# Patient Record
Sex: FEMALE | Race: WHITE | NOT HISPANIC OR LATINO | Employment: OTHER | URBAN - METROPOLITAN AREA
[De-identification: names, ages, dates, MRNs, and addresses within clinical notes are randomized per-mention and may not be internally consistent; named-entity substitution may affect disease eponyms.]

---

## 2017-01-22 DIAGNOSIS — F41.9 ANXIETY: ICD-10-CM

## 2017-01-23 RX ORDER — BUPROPION HYDROCHLORIDE 150 MG/1
TABLET, EXTENDED RELEASE ORAL
Qty: 30 TABLET | Refills: 11 | Status: SHIPPED | OUTPATIENT
Start: 2017-01-23 | End: 2019-11-22 | Stop reason: SDUPTHER

## 2017-01-23 NOTE — TELEPHONE ENCOUNTER
Carly desire refill of Wellbutrin. Last visit post op visit 7/16  Patient Active Problem List   Diagnosis    History of stillbirth in currently pregnant patient    Supervision of normal pregnancy    Pregnancy    Back pain affecting pregnancy in third trimester    Normal labor     Prior to Admission medications    Medication Sig Start Date End Date Taking? Authorizing Provider   buPROPion (WELLBUTRIN SR) 150 MG TBSR 12 hr tablet Take 1 tablet (150 mg total) by mouth once daily. 1/4/16   Joel Schaefer MD   CITRANATAL 90 DHA, ALGAL OIL, 90 mg iron-1 mg -50 mg-300 mg Cmpk 1 tablet once daily. 6/27/16   Historical Provider, MD   oxycodone-acetaminophen (PERCOCET)  mg per tablet Take 1 tablet by mouth every 6 (six) hours as needed. 6/26/16   Alia Rodriguez MD   CMS54-mane [id number]&gluc-[id number]-DSS-dha (CITRANATAL 90 DHA, NEW FORMULA) 90 mg iron-1 mg -50 mg-300 mg Cmpk Take 1 tablet by mouth once daily. 10/19/15   Joel Schaefer MD

## 2017-11-13 PROBLEM — M48.062 LUMBAR STENOSIS WITH NEUROGENIC CLAUDICATION: Status: ACTIVE | Noted: 2017-11-13

## 2018-08-17 ENCOUNTER — OFFICE VISIT (OUTPATIENT)
Dept: OBSTETRICS AND GYNECOLOGY | Facility: CLINIC | Age: 33
End: 2018-08-17
Payer: COMMERCIAL

## 2018-08-17 VITALS
HEART RATE: 78 BPM | DIASTOLIC BLOOD PRESSURE: 80 MMHG | RESPIRATION RATE: 10 BRPM | HEIGHT: 67 IN | BODY MASS INDEX: 29.51 KG/M2 | WEIGHT: 188 LBS | SYSTOLIC BLOOD PRESSURE: 120 MMHG

## 2018-08-17 DIAGNOSIS — N63.10 BREAST MASS, RIGHT: Primary | ICD-10-CM

## 2018-08-17 PROCEDURE — 99213 OFFICE O/P EST LOW 20 MIN: CPT | Mod: S$GLB,,, | Performed by: OBSTETRICS & GYNECOLOGY

## 2018-08-17 PROCEDURE — 99999 PR PBB SHADOW E&M-EST. PATIENT-LVL III: CPT | Mod: PBBFAC,,, | Performed by: OBSTETRICS & GYNECOLOGY

## 2018-08-17 PROCEDURE — 3008F BODY MASS INDEX DOCD: CPT | Mod: CPTII,S$GLB,, | Performed by: OBSTETRICS & GYNECOLOGY

## 2018-08-17 NOTE — PROGRESS NOTES
Subjective:    Patient ID: Carly Go is a 33 y.o. y.o. female.     Chief Complaint:   Chief Complaint   Patient presents with    Breast Mass       History of Present Illness:   Carly presents for evaluation of a right breast mass palpated for ~ 1 week. She reports she noticed her breast was tender and that is when she found the mass. She denies any trauma. No skin changes. No nipple discharge. Does feel as if the mass has increased in size the last few days.     ROS:   CONSTITUTIONAL: Negative for fever, chills, diaphoresis, weakness, fatigue, weight loss, weight gain  GASTROINTESTINAL: negative for abdominal pain, flank pain, nausea, vomiting, diarrhea, constipation, black stool, blood in stool  BREAST: As above  GENITOURINARY: negative for dysuria, frequency/urgency, hematuria, genital discharge, vaginal bleeding, irregular menses, heavy menses, pelvic pain  HEMATOLOGIC/LYMPHATIC: negative for swollen lymph nodes, bleeding, bruising  MUSCULOSKELETAL: negative for back pain, joint pain, joint stiffness, joint swelling, muscle pain, muscle weakness  ENDOCRINE: negative for polydipsia/polyuria, palpitations, skin changes, temperature intolerance, unexpected weight changes      Physical Exam:   Vital Signs:  Vitals:    08/17/18 1423   BP: 120/80   Pulse: 78   Resp: 10       General:  alert,normal appearing gravid female   Breasts:  left breast with no masses, tenderness, or nipple discharge. Right breast with ~ 3 cm mobile, smooth, round mass palpated at the 12 oclock position ~ 2-3 cm superior to nipple       Assessment:      1. Breast mass, right          Plan:      Breast mass, right  -     US Breast Right Complete; Future; Expected date: 08/17/2018  -     Mammo Digital Diagnostic Right with Marlo; Future; Expected date: 08/17/2018

## 2018-08-21 ENCOUNTER — HOSPITAL ENCOUNTER (OUTPATIENT)
Dept: RADIOLOGY | Facility: HOSPITAL | Age: 33
Discharge: HOME | End: 2018-08-21
Attending: OBSTETRICS & GYNECOLOGY
Payer: COMMERCIAL

## 2018-08-21 VITALS — HEIGHT: 67 IN | BODY MASS INDEX: 29.51 KG/M2 | WEIGHT: 188 LBS

## 2018-08-21 DIAGNOSIS — N63.10 BREAST MASS, RIGHT: ICD-10-CM

## 2018-08-21 PROCEDURE — 77066 DX MAMMO INCL CAD BI: CPT | Mod: 26,,, | Performed by: RADIOLOGY

## 2018-08-21 PROCEDURE — 77062 BREAST TOMOSYNTHESIS BI: CPT | Mod: TC

## 2018-08-21 PROCEDURE — 76642 ULTRASOUND BREAST LIMITED: CPT | Mod: 26,RT,, | Performed by: RADIOLOGY

## 2018-08-21 PROCEDURE — 76642 ULTRASOUND BREAST LIMITED: CPT | Mod: TC,RT

## 2018-08-21 PROCEDURE — 77062 BREAST TOMOSYNTHESIS BI: CPT | Mod: 26,,, | Performed by: RADIOLOGY

## 2019-10-07 ENCOUNTER — TELEPHONE (OUTPATIENT)
Dept: OBSTETRICS AND GYNECOLOGY | Facility: CLINIC | Age: 34
End: 2019-10-07

## 2019-10-07 NOTE — TELEPHONE ENCOUNTER
----- Message from Suly Hennessy MA sent at 10/7/2019  2:38 PM CDT -----  Contact: Self      ----- Message -----  From: Roxanne Michelle  Sent: 10/7/2019   2:28 PM CDT  To: Jennifer Rojo Staff    Carly Go  MRN: [id number]  Home Phone      [phone]  Work Phone      Not on file.  Mobile          [phone]    Patient Care Team:  Naya Garcia NP as PCP - General (Family Medicine)  Joel Schaefer MD as Obstetrician (Obstetrics)  OB? No  What phone number can you be reached at? [phone]  Message:   Pt states she has a lump in her breast and she would like to get seen. Please return call.

## 2019-10-07 NOTE — TELEPHONE ENCOUNTER
Pt had a mammogram and u/s 8/2018 was suppose to follow up and is now calling appointment given. Patient states that the lump is now bigger and noticeable state she does have nipple discharge occasionally it is clear with yellowish brownish color with  An occasional deep ache and sharp pain. Pt scheduled to accommodate her schedule.

## 2019-10-11 ENCOUNTER — OFFICE VISIT (OUTPATIENT)
Dept: OBSTETRICS AND GYNECOLOGY | Facility: CLINIC | Age: 34
End: 2019-10-11
Payer: COMMERCIAL

## 2019-10-11 VITALS
HEART RATE: 76 BPM | DIASTOLIC BLOOD PRESSURE: 88 MMHG | RESPIRATION RATE: 13 BRPM | HEIGHT: 67 IN | WEIGHT: 194 LBS | BODY MASS INDEX: 30.45 KG/M2 | SYSTOLIC BLOOD PRESSURE: 142 MMHG

## 2019-10-11 DIAGNOSIS — N63.10 BREAST MASS, RIGHT: Primary | ICD-10-CM

## 2019-10-11 PROCEDURE — 3008F BODY MASS INDEX DOCD: CPT | Mod: CPTII,S$GLB,, | Performed by: OBSTETRICS & GYNECOLOGY

## 2019-10-11 PROCEDURE — 99214 OFFICE O/P EST MOD 30 MIN: CPT | Mod: S$GLB,,, | Performed by: OBSTETRICS & GYNECOLOGY

## 2019-10-11 PROCEDURE — 99999 PR PBB SHADOW E&M-EST. PATIENT-LVL III: ICD-10-PCS | Mod: PBBFAC,,, | Performed by: OBSTETRICS & GYNECOLOGY

## 2019-10-11 PROCEDURE — 99999 PR PBB SHADOW E&M-EST. PATIENT-LVL III: CPT | Mod: PBBFAC,,, | Performed by: OBSTETRICS & GYNECOLOGY

## 2019-10-11 PROCEDURE — 99214 PR OFFICE/OUTPT VISIT, EST, LEVL IV, 30-39 MIN: ICD-10-PCS | Mod: S$GLB,,, | Performed by: OBSTETRICS & GYNECOLOGY

## 2019-10-11 PROCEDURE — 3008F PR BODY MASS INDEX (BMI) DOCUMENTED: ICD-10-PCS | Mod: CPTII,S$GLB,, | Performed by: OBSTETRICS & GYNECOLOGY

## 2019-10-11 RX ORDER — TIZANIDINE 4 MG/1
4 TABLET ORAL DAILY
COMMUNITY
End: 2022-05-13

## 2019-10-11 RX ORDER — IBUPROFEN 100 MG/5ML
1000 SUSPENSION, ORAL (FINAL DOSE FORM) ORAL DAILY
COMMUNITY

## 2019-10-11 NOTE — PROGRESS NOTES
Subjective:       Patient ID: Carly Go is a 34 y.o. female.    Chief Complaint:  Breast Mass (right sided, seems to be bigger, has dull pain)      History of Present Illness  Patient presents complaining of a breast mass in her right breast.  Patient states approximately 1 year ago she had a mammogram done and she was post have a close follow-up.  She has not had that follow-up done.  Patient states it has grown.  Initially started off small and nontender. Patient states that it is much easier to palpate now and is occasionally tender.  Patient states it is not drained anything.  Patient has a family history of breast complaints.    Menstrual History:  OB History        3    Para   2    Term   2            AB   1    Living   2       SAB   0    TAB   1    Ectopic        Multiple   0    Live Births   2                Menarche age:  Patient's last menstrual period was 09/15/2019 (approximate).         Review of Systems  Review of Systems   Constitutional: Positive for fatigue. Negative for activity change, appetite change, chills, diaphoresis, fever and unexpected weight change.   HENT: Negative for congestion, dental problem, drooling, ear discharge, ear pain, facial swelling, hearing loss, mouth sores, nosebleeds, postnasal drip, rhinorrhea, sinus pressure, sneezing, sore throat, tinnitus, trouble swallowing and voice change.    Eyes: Negative for photophobia, pain, discharge, redness, itching and visual disturbance.   Respiratory: Negative for apnea, cough, choking, chest tightness, shortness of breath, wheezing and stridor.    Cardiovascular: Negative for chest pain, palpitations and leg swelling.   Gastrointestinal: Negative for abdominal distention, abdominal pain, anal bleeding, blood in stool, constipation, diarrhea, nausea, rectal pain and vomiting.   Endocrine: Negative for cold intolerance, heat intolerance, polydipsia, polyphagia and polyuria.   Genitourinary: Positive for  vaginal bleeding. Negative for decreased urine volume, difficulty urinating, dyspareunia, dysuria, enuresis, flank pain, frequency, genital sores, hematuria, menstrual problem, pelvic pain, urgency, vaginal discharge and vaginal pain.   Musculoskeletal: Negative for arthralgias, back pain, gait problem, joint swelling, myalgias, neck pain and neck stiffness.   Skin: Negative for color change, pallor, rash and wound.   Allergic/Immunologic: Positive for environmental allergies. Negative for food allergies and immunocompromised state.   Neurological: Positive for headaches. Negative for dizziness, tremors, seizures, syncope, facial asymmetry, speech difficulty, weakness, light-headedness and numbness.   Hematological: Negative for adenopathy. Does not bruise/bleed easily.   Psychiatric/Behavioral: Negative for agitation, behavioral problems, confusion, decreased concentration, dysphoric mood, hallucinations, self-injury, sleep disturbance and suicidal ideas. The patient is not nervous/anxious and is not hyperactive.            Objective:      Physical Exam   Pulmonary/Chest: Right breast exhibits mass. Right breast exhibits no inverted nipple, no nipple discharge, no skin change and no tenderness. Left breast exhibits no inverted nipple, no mass, no nipple discharge, no skin change and no tenderness.       Nursing note and vitals reviewed.          Assessment:        1. Breast mass, right                Plan:         Carly was seen today for breast mass.    Diagnoses and all orders for this visit:    Breast mass, right  -     Mammo Digital Diagnostic Right With CAD; Future  -     US Breast Right Complete; Future

## 2019-10-17 ENCOUNTER — TELEPHONE (OUTPATIENT)
Dept: RADIOLOGY | Facility: HOSPITAL | Age: 34
End: 2019-10-17

## 2019-10-17 ENCOUNTER — HOSPITAL ENCOUNTER (OUTPATIENT)
Dept: RADIOLOGY | Facility: HOSPITAL | Age: 34
Discharge: HOME | End: 2019-10-17
Attending: OBSTETRICS & GYNECOLOGY
Payer: COMMERCIAL

## 2019-10-17 ENCOUNTER — TELEPHONE (OUTPATIENT)
Dept: OBSTETRICS AND GYNECOLOGY | Facility: CLINIC | Age: 34
End: 2019-10-17

## 2019-10-17 ENCOUNTER — PATIENT MESSAGE (OUTPATIENT)
Dept: RADIOLOGY | Facility: HOSPITAL | Age: 34
End: 2019-10-17

## 2019-10-17 VITALS — WEIGHT: 194 LBS | HEIGHT: 67 IN | BODY MASS INDEX: 30.45 KG/M2

## 2019-10-17 DIAGNOSIS — R92.8 ABNORMAL MAMMOGRAM: Primary | ICD-10-CM

## 2019-10-17 DIAGNOSIS — N63.10 BREAST MASS, RIGHT: ICD-10-CM

## 2019-10-17 DIAGNOSIS — N63.10 BREAST MASS, RIGHT: Primary | ICD-10-CM

## 2019-10-17 PROCEDURE — 76642 US BREAST RIGHT LIMITED: ICD-10-PCS | Mod: 26,RT,, | Performed by: RADIOLOGY

## 2019-10-17 PROCEDURE — 77061 MAMMO DIGITAL DIAGNOSTIC RIGHT WITH TOMOSYNTHESIS_CAD: ICD-10-PCS | Mod: 26,,, | Performed by: RADIOLOGY

## 2019-10-17 PROCEDURE — 76642 ULTRASOUND BREAST LIMITED: CPT | Mod: TC,RT

## 2019-10-17 PROCEDURE — 76642 ULTRASOUND BREAST LIMITED: CPT | Mod: 26,RT,, | Performed by: RADIOLOGY

## 2019-10-17 PROCEDURE — 77061 BREAST TOMOSYNTHESIS UNI: CPT | Mod: 26,,, | Performed by: RADIOLOGY

## 2019-10-17 PROCEDURE — 77065 DX MAMMO INCL CAD UNI: CPT | Mod: 26,,, | Performed by: RADIOLOGY

## 2019-10-17 PROCEDURE — 77065 MAMMO DIGITAL DIAGNOSTIC RIGHT WITH TOMOSYNTHESIS_CAD: ICD-10-PCS | Mod: 26,,, | Performed by: RADIOLOGY

## 2019-10-17 PROCEDURE — 77065 DX MAMMO INCL CAD UNI: CPT | Mod: TC

## 2019-10-17 NOTE — TELEPHONE ENCOUNTER
----- Message from Lindsey Douglas LPN sent at 10/17/2019 10:41 AM CDT -----  Pt states she would like to move forward with a breast bx in [place], orders placed. Please schedule.

## 2019-10-17 NOTE — TELEPHONE ENCOUNTER
Spoke with patient. Reviewed breast biopsy procedure and reviewed instructions for breast biopsy. Patient expressed understanding and all questions were answered. Provided patient with my phone number to call for any further concerns or questions.   Patient scheduled breast biopsy at the [facility] on 10/23/2019.

## 2019-10-23 ENCOUNTER — HOSPITAL ENCOUNTER (OUTPATIENT)
Dept: RADIOLOGY | Facility: HOSPITAL | Age: 34
Discharge: HOME | End: 2019-10-23
Attending: OBSTETRICS & GYNECOLOGY
Payer: COMMERCIAL

## 2019-10-23 DIAGNOSIS — N63.10 BREAST MASS, RIGHT: ICD-10-CM

## 2019-10-23 PROCEDURE — 19083 US BREAST BIOPSY WITH IMAGING 1ST SITE RIGHT: ICD-10-PCS | Mod: RT,,, | Performed by: RADIOLOGY

## 2019-10-23 PROCEDURE — 88305 TISSUE EXAM BY PATHOLOGIST: CPT | Mod: 26,,, | Performed by: PATHOLOGY

## 2019-10-23 PROCEDURE — 77065 DX MAMMO INCL CAD UNI: CPT | Mod: TC,PO,RT

## 2019-10-23 PROCEDURE — 88305 TISSUE SPECIMEN TO PATHOLOGY, RADIOLOGY: ICD-10-PCS | Mod: 26,,, | Performed by: PATHOLOGY

## 2019-10-23 PROCEDURE — 77065 DX MAMMO INCL CAD UNI: CPT | Mod: 26,RT,, | Performed by: RADIOLOGY

## 2019-10-23 PROCEDURE — 25000003 PHARM REV CODE 250: Mod: PO | Performed by: OBSTETRICS & GYNECOLOGY

## 2019-10-23 PROCEDURE — 19083 BX BREAST 1ST LESION US IMAG: CPT | Mod: RT,,, | Performed by: RADIOLOGY

## 2019-10-23 PROCEDURE — 77065 MAMMO DIGITAL DIAGNOSTIC RIGHT WITH CAD: ICD-10-PCS | Mod: 26,RT,, | Performed by: RADIOLOGY

## 2019-10-23 PROCEDURE — 88305 TISSUE EXAM BY PATHOLOGIST: CPT | Performed by: PATHOLOGY

## 2019-10-23 PROCEDURE — 27201068 US BREAST BIOPSY WITH IMAGING 1ST SITE RIGHT: Mod: PO

## 2019-10-23 RX ORDER — LIDOCAINE HYDROCHLORIDE AND EPINEPHRINE 20; 10 MG/ML; UG/ML
9 INJECTION, SOLUTION INFILTRATION; PERINEURAL ONCE
Status: COMPLETED | OUTPATIENT
Start: 2019-10-23 | End: 2019-10-23

## 2019-10-23 RX ORDER — LIDOCAINE HYDROCHLORIDE 10 MG/ML
2 INJECTION, SOLUTION EPIDURAL; INFILTRATION; INTRACAUDAL; PERINEURAL ONCE
Status: COMPLETED | OUTPATIENT
Start: 2019-10-23 | End: 2019-10-23

## 2019-10-23 RX ADMIN — LIDOCAINE HYDROCHLORIDE 2 ML: 10 INJECTION, SOLUTION EPIDURAL; INFILTRATION; INTRACAUDAL; PERINEURAL at 01:10

## 2019-10-23 RX ADMIN — LIDOCAINE HYDROCHLORIDE AND EPINEPHRINE 9 ML: 20; 10 INJECTION, SOLUTION INFILTRATION; PERINEURAL at 01:10

## 2019-10-25 ENCOUNTER — TELEPHONE (OUTPATIENT)
Dept: SURGERY | Facility: CLINIC | Age: 34
End: 2019-10-25

## 2019-10-25 NOTE — TELEPHONE ENCOUNTER
Pt received biopsy results from Dr. Landis. Spoke to pt to schedule appt with breast surgeon. Pt was scheduled to see Dr. Blanchard on 10/28/2019. Reviewed location with pt. Pt verbalized understanding

## 2019-10-28 ENCOUNTER — OFFICE VISIT (OUTPATIENT)
Dept: SURGERY | Facility: CLINIC | Age: 34
End: 2019-10-28
Payer: COMMERCIAL

## 2019-10-28 VITALS
HEIGHT: 67 IN | TEMPERATURE: 99 F | DIASTOLIC BLOOD PRESSURE: 101 MMHG | BODY MASS INDEX: 30.5 KG/M2 | WEIGHT: 194.31 LBS | HEART RATE: 86 BPM | SYSTOLIC BLOOD PRESSURE: 163 MMHG

## 2019-10-28 DIAGNOSIS — Z91.89 AT HIGH RISK FOR BREAST CANCER: ICD-10-CM

## 2019-10-28 DIAGNOSIS — N63.10 BREAST MASS, RIGHT: Primary | ICD-10-CM

## 2019-10-28 PROCEDURE — 99204 PR OFFICE/OUTPT VISIT, NEW, LEVL IV, 45-59 MIN: ICD-10-PCS | Mod: S$GLB,,, | Performed by: SURGERY

## 2019-10-28 PROCEDURE — 3008F PR BODY MASS INDEX (BMI) DOCUMENTED: ICD-10-PCS | Mod: CPTII,S$GLB,, | Performed by: SURGERY

## 2019-10-28 PROCEDURE — 3008F BODY MASS INDEX DOCD: CPT | Mod: CPTII,S$GLB,, | Performed by: SURGERY

## 2019-10-28 PROCEDURE — 99999 PR PBB SHADOW E&M-EST. PATIENT-LVL III: ICD-10-PCS | Mod: PBBFAC,,, | Performed by: SURGERY

## 2019-10-28 PROCEDURE — 99999 PR PBB SHADOW E&M-EST. PATIENT-LVL III: CPT | Mod: PBBFAC,,, | Performed by: SURGERY

## 2019-10-28 PROCEDURE — 99204 OFFICE O/P NEW MOD 45 MIN: CPT | Mod: S$GLB,,, | Performed by: SURGERY

## 2019-10-28 NOTE — PROGRESS NOTES
Breast Surgery  [facility]  Department of Surgery      REFERRING PROVIDER: SAADIA Fernandez MD--OB/GYN    Chief Complaint: right Fibroepithelial Lesion    Subjective:      Patient ID: Carly Go is a 34 y.o. female who presents with diagnostic mammogram which showed an oval mass in the right breast at 12oc in the middle depth.  Follow up US showed a 2.6cm heterogeneous mass at 12oc in the middle depth corresponding with the MMG on 10/17/2019.    A ultrasound guided biopsy was performed on 10/23/2019 with pathology revealing fibroepithelial growth of the right breast.     Patient does routinely do self breast exams.  Patient has noted a change on breast exam.  First noticed the mass 2018, thinks it got a little larger over the last year. Patient rerports bloody nipple discharge starting after the biopsy, with expression only in the shower. Patient denies to previous breast biopsy. Patient denies a personal history of breast cancer.    Patients tyer cuzick score is 42.59%.    Mother breast cancer 35, maternal GM breast and ovarian cancer.  PGM breast cancer  Father lung and esophageal cancer    Patient has had a left oophorectomy performed in .        GYN History:  Patient is . Age of first live birth was 20. Patient did breast feed.    Family History:  Maternal Grandmother Breast cancer at 53  Maternal Grandmother Ovarian cancer  Mother breast cancer at 35    Past Medical History:   Diagnosis Date    Abnormal Pap smear     Asthma     Encounter for blood transfusion     Lumbar stenosis 2017    Mental disorder     anxiety, depression    Thyroid disease 2017    thyroid mass, recent biopsy     Past Surgical History:   Procedure Laterality Date    BACK SURGERY      lumbar laminectomy    DILATION AND CURETTAGE OF UTERUS  2004    OOPHORECTOMY  2009    Left    TUBAL LIGATION  2016     Current Outpatient Medications on File Prior to Visit   Medication  Sig Dispense Refill    ACETAMINOPHEN (TYLENOL ARTHRITIS PAIN ORAL) Take by mouth daily as needed.      ascorbic acid, vitamin C, (VITAMIN C) 1000 MG tablet Take 1,000 mg by mouth once daily.      buPROPion (WELLBUTRIN SR) 150 MG TBSR 12 hr tablet TAKE 1 TABLET (150 MG TOTAL) BY MOUTH ONCE DAILY. (Patient taking differently: 300mg daily) 30 tablet 11    gabapentin (NEURONTIN) 300 MG capsule Take 300 mg by mouth 2 (two) times daily.      loratadine (CLARITIN) 10 mg tablet Take 10 mg by mouth once daily.      tiZANidine (ZANAFLEX) 4 MG tablet Take 4 mg by mouth once daily.       No current facility-administered medications on file prior to visit.      Social History     Socioeconomic History    Marital status:      Spouse name: Not on file    Number of children: Not on file    Years of education: Not on file    Highest education level: Not on file   Occupational History    Not on file   Social Needs    Financial resource strain: Not on file    Food insecurity:     Worry: Not on file     Inability: Not on file    Transportation needs:     Medical: Not on file     Non-medical: Not on file   Tobacco Use    Smoking status: Never Smoker    Smokeless tobacco: Never Used   Substance and Sexual Activity    Alcohol use: No    Drug use: No    Sexual activity: Yes     Partners: Male     Birth control/protection: None     Comment:    Lifestyle    Physical activity:     Days per week: Not on file     Minutes per session: Not on file    Stress: Not on file   Relationships    Social connections:     Talks on phone: Not on file     Gets together: Not on file     Attends Confucianism service: Not on file     Active member of club or organization: Not on file     Attends meetings of clubs or organizations: Not on file     Relationship status: Not on file   Other Topics Concern    Not on file   Social History Narrative    Not on file     Family History   Problem Relation Age of Onset    Breast cancer  "Maternal Grandmother 53    Colon cancer Maternal Grandmother     Ovarian cancer Maternal Grandmother     Colon cancer Father 56    Cancer Father     Breast cancer Mother 35        Review of Systems   Pertinent items noted above.    Objective:   BP (!) 163/101 (BP Location: Left arm, Patient Position: Sitting, BP Method: Medium (Automatic))   Pulse 86   Temp 98.8 °F (37.1 °C) (Oral)   Ht 5' 7" (1.702 m)   Wt 88.2 kg (194 lb 5.4 oz)   LMP 10/19/2019 (Approximate)   BMI 30.44 kg/m²     Physical Exam   Pulmonary/Chest: She exhibits no tenderness and no deformity. Right breast exhibits mass. Right breast exhibits no inverted nipple, no nipple discharge (none on exam today.), no skin change and no tenderness. Left breast exhibits no inverted nipple, no mass, no nipple discharge, no skin change and no tenderness.       Lymphadenopathy:     She has no cervical adenopathy.     She has no axillary adenopathy.        Right: No supraclavicular adenopathy present.        Left: No supraclavicular adenopathy present.       Radiology review: Images personally reviewed by me in the clinic.     Assessment:       33 yo high risk with TC 42% and right breast mass  Plan:   Discussed high risk options.  Discussed diet, exercise, use of tamoxifen.  Also discussed genetics and MRI.  She would like to proceed with both.    For the RIGHT breast samuel with biopsy showing fibroepithelial lesion.  Discussed I suspect this is fibroadenoma given stabliity for a year, however recommend excision given the pathology findings.  Will do excisional biopsy with ultrasound.    45 minutes spent face to face with patient.    "

## 2019-10-28 NOTE — H&P (VIEW-ONLY)
Breast Surgery  [facility]  Department of Surgery      REFERRING PROVIDER: SAADIA Fernandez MD--OB/GYN    Chief Complaint: right Fibroepithelial Lesion    Subjective:      Patient ID: Carly Go is a 34 y.o. female who presents with diagnostic mammogram which showed an oval mass in the right breast at 12oc in the middle depth.  Follow up US showed a 2.6cm heterogeneous mass at 12oc in the middle depth corresponding with the MMG on 10/17/2019.    A ultrasound guided biopsy was performed on 10/23/2019 with pathology revealing fibroepithelial growth of the right breast.     Patient does routinely do self breast exams.  Patient has noted a change on breast exam.  First noticed the mass 2018, thinks it got a little larger over the last year. Patient rerports bloody nipple discharge starting after the biopsy, with expression only in the shower. Patient denies to previous breast biopsy. Patient denies a personal history of breast cancer.    Patients tyer cuzick score is 42.59%.    Mother breast cancer 35, maternal GM breast and ovarian cancer.  PGM breast cancer  Father lung and esophageal cancer    Patient has had a left oophorectomy performed in .        GYN History:  Patient is . Age of first live birth was 20. Patient did breast feed.    Family History:  Maternal Grandmother Breast cancer at 53  Maternal Grandmother Ovarian cancer  Mother breast cancer at 35    Past Medical History:   Diagnosis Date    Abnormal Pap smear     Asthma     Encounter for blood transfusion     Lumbar stenosis 2017    Mental disorder     anxiety, depression    Thyroid disease 2017    thyroid mass, recent biopsy     Past Surgical History:   Procedure Laterality Date    BACK SURGERY      lumbar laminectomy    DILATION AND CURETTAGE OF UTERUS  2004    OOPHORECTOMY  2009    Left    TUBAL LIGATION  2016     Current Outpatient Medications on File Prior to Visit   Medication  Sig Dispense Refill    ACETAMINOPHEN (TYLENOL ARTHRITIS PAIN ORAL) Take by mouth daily as needed.      ascorbic acid, vitamin C, (VITAMIN C) 1000 MG tablet Take 1,000 mg by mouth once daily.      buPROPion (WELLBUTRIN SR) 150 MG TBSR 12 hr tablet TAKE 1 TABLET (150 MG TOTAL) BY MOUTH ONCE DAILY. (Patient taking differently: 300mg daily) 30 tablet 11    gabapentin (NEURONTIN) 300 MG capsule Take 300 mg by mouth 2 (two) times daily.      loratadine (CLARITIN) 10 mg tablet Take 10 mg by mouth once daily.      tiZANidine (ZANAFLEX) 4 MG tablet Take 4 mg by mouth once daily.       No current facility-administered medications on file prior to visit.      Social History     Socioeconomic History    Marital status:      Spouse name: Not on file    Number of children: Not on file    Years of education: Not on file    Highest education level: Not on file   Occupational History    Not on file   Social Needs    Financial resource strain: Not on file    Food insecurity:     Worry: Not on file     Inability: Not on file    Transportation needs:     Medical: Not on file     Non-medical: Not on file   Tobacco Use    Smoking status: Never Smoker    Smokeless tobacco: Never Used   Substance and Sexual Activity    Alcohol use: No    Drug use: No    Sexual activity: Yes     Partners: Male     Birth control/protection: None     Comment:    Lifestyle    Physical activity:     Days per week: Not on file     Minutes per session: Not on file    Stress: Not on file   Relationships    Social connections:     Talks on phone: Not on file     Gets together: Not on file     Attends Taoism service: Not on file     Active member of club or organization: Not on file     Attends meetings of clubs or organizations: Not on file     Relationship status: Not on file   Other Topics Concern    Not on file   Social History Narrative    Not on file     Family History   Problem Relation Age of Onset    Breast cancer  "Maternal Grandmother 53    Colon cancer Maternal Grandmother     Ovarian cancer Maternal Grandmother     Colon cancer Father 56    Cancer Father     Breast cancer Mother 35        Review of Systems   Pertinent items noted above.    Objective:   BP (!) 163/101 (BP Location: Left arm, Patient Position: Sitting, BP Method: Medium (Automatic))   Pulse 86   Temp 98.8 °F (37.1 °C) (Oral)   Ht 5' 7" (1.702 m)   Wt 88.2 kg (194 lb 5.4 oz)   LMP 10/19/2019 (Approximate)   BMI 30.44 kg/m²     Physical Exam   Pulmonary/Chest: She exhibits no tenderness and no deformity. Right breast exhibits mass. Right breast exhibits no inverted nipple, no nipple discharge (none on exam today.), no skin change and no tenderness. Left breast exhibits no inverted nipple, no mass, no nipple discharge, no skin change and no tenderness.       Lymphadenopathy:     She has no cervical adenopathy.     She has no axillary adenopathy.        Right: No supraclavicular adenopathy present.        Left: No supraclavicular adenopathy present.       Radiology review: Images personally reviewed by me in the clinic.     Assessment:       33 yo high risk with TC 42% and right breast mass  Plan:   Discussed high risk options.  Discussed diet, exercise, use of tamoxifen.  Also discussed genetics and MRI.  She would like to proceed with both.    For the RIGHT breast samuel with biopsy showing fibroepithelial lesion.  Discussed I suspect this is fibroadenoma given stabliity for a year, however recommend excision given the pathology findings.  Will do excisional biopsy with ultrasound.    45 minutes spent face to face with patient.    "

## 2019-10-28 NOTE — LETTER
October 28, 2019      Joel Schaefer DPM  [address]  [place] [address]           [facility]  [address]  [place] [address]  Phone: [phone]  Fax: [phone]          Patient: Carly Go   MR Number: [id number]   YOB: 1985   Date of Visit: 10/28/2019       Dear Joel Schaefer:    Thank you for referring Carly Go to me for evaluation. Attached you will find relevant portions of my assessment and plan of care.    If you have questions, please do not hesitate to call me. I look forward to following Carly Go along with you.    Sincerely,    Joi Blanchard MD    Enclosure  CC:  No Recipients    If you would like to receive this communication electronically, please contact [email or web address] or [phone] to request more information on [employer] access.    For providers and/or their staff who would like to refer a patient to [facility], please contact us through our one-stop-shop provider referral line, [facility], at [phone].    If you feel you have received this communication in error or would no longer like to receive these types of communications, please e-mail [email or web address]

## 2019-10-31 ENCOUNTER — TELEPHONE (OUTPATIENT)
Dept: SURGERY | Facility: CLINIC | Age: 34
End: 2019-10-31

## 2019-10-31 DIAGNOSIS — N63.10 BREAST MASS, RIGHT: Primary | ICD-10-CM

## 2019-10-31 NOTE — PRE-PROCEDURE INSTRUCTIONS
PREOP INSTRUCTIONS:No solid food ,milk or milk products for 8 hours prior to procedure.Clear liquids are allowed up to 2 hours before arrival time.Clear liquids are:water,apple juice,pedialyte,gatorade,& jello.Shower instructions as well as directions to the 2nd floor Surgery Center were given.Patient encouraged to wear loose fitting,comfortable clothing that preferably buttons up the front.Medication instructions for pm prior to and am of procedure reviewed.Instructed patient to avoid taking vitamins,supplements,aspirin and ibuprofen the morning of surgery.Patient stated an understanding.    Patient denies any side effects or issues with anesthesia or sedation.

## 2019-10-31 NOTE — TELEPHONE ENCOUNTER
Spoke to Patient.  Stated that she has a 0715 MRI at the [facility].  Directions given to the [facility].  Arrival time of 1000 given to check in at the Surgery Center on the 2nd Floor of the Hospital on [address].  Stated that she will probably check in after she finishes with the MRI.   Instructed that she can have clear liquids between 9528-3205 and then to remain NPO after 0900, to wash up tonight and in the morning with an antibacterial soap, not to wear anything metal or to apply any lotions, powders, or deodorant, and to wear something easy to remove.  Patient verbalized understanding of instructions.

## 2019-11-01 ENCOUNTER — HOSPITAL ENCOUNTER (OUTPATIENT)
Dept: RADIOLOGY | Facility: HOSPITAL | Age: 34
Discharge: HOME | End: 2019-11-01
Attending: SURGERY
Payer: COMMERCIAL

## 2019-11-01 ENCOUNTER — HOSPITAL ENCOUNTER (OUTPATIENT)
Dept: RADIOLOGY | Facility: HOSPITAL | Age: 34
Discharge: HOME | End: 2019-11-01
Attending: SURGERY | Admitting: SURGERY
Payer: COMMERCIAL

## 2019-11-01 ENCOUNTER — HOSPITAL ENCOUNTER (OUTPATIENT)
Facility: HOSPITAL | Age: 34
Discharge: HOME | End: 2019-11-01
Attending: SURGERY | Admitting: SURGERY
Payer: COMMERCIAL

## 2019-11-01 ENCOUNTER — ANESTHESIA EVENT (OUTPATIENT)
Dept: SURGERY | Facility: HOSPITAL | Age: 34
End: 2019-11-01
Payer: COMMERCIAL

## 2019-11-01 ENCOUNTER — ANESTHESIA (OUTPATIENT)
Dept: SURGERY | Facility: HOSPITAL | Age: 34
End: 2019-11-01
Payer: COMMERCIAL

## 2019-11-01 VITALS
BODY MASS INDEX: 30.29 KG/M2 | SYSTOLIC BLOOD PRESSURE: 149 MMHG | WEIGHT: 193 LBS | DIASTOLIC BLOOD PRESSURE: 79 MMHG | RESPIRATION RATE: 21 BRPM | HEIGHT: 67 IN | TEMPERATURE: 99 F | HEART RATE: 83 BPM | OXYGEN SATURATION: 100 %

## 2019-11-01 DIAGNOSIS — N63.10 BREAST MASS, RIGHT: ICD-10-CM

## 2019-11-01 DIAGNOSIS — N63.10 BREAST MASS, RIGHT: Primary | ICD-10-CM

## 2019-11-01 DIAGNOSIS — Z91.89 AT HIGH RISK FOR BREAST CANCER: ICD-10-CM

## 2019-11-01 LAB
B-HCG UR QL: NEGATIVE
CTP QC/QA: YES

## 2019-11-01 PROCEDURE — 77049 MRI BREAST W/WO CONTRAST, W/CAD, BILATERAL: ICD-10-PCS | Mod: 26,,, | Performed by: RADIOLOGY

## 2019-11-01 PROCEDURE — 71000015 HC POSTOP RECOV 1ST HR: Performed by: SURGERY

## 2019-11-01 PROCEDURE — 36000706: Performed by: SURGERY

## 2019-11-01 PROCEDURE — 88305 TISSUE SPECIMEN TO PATHOLOGY - SURGERY: ICD-10-PCS | Mod: 26,,, | Performed by: PATHOLOGY

## 2019-11-01 PROCEDURE — D9220A PRA ANESTHESIA: Mod: ANES,,, | Performed by: ANESTHESIOLOGY

## 2019-11-01 PROCEDURE — 88305 TISSUE EXAM BY PATHOLOGIST: CPT | Performed by: PATHOLOGY

## 2019-11-01 PROCEDURE — 71000044 HC DOSC ROUTINE RECOVERY FIRST HOUR: Performed by: SURGERY

## 2019-11-01 PROCEDURE — 25500020 PHARM REV CODE 255: Performed by: SURGERY

## 2019-11-01 PROCEDURE — D9220A PRA ANESTHESIA: ICD-10-PCS | Mod: ANES,,, | Performed by: ANESTHESIOLOGY

## 2019-11-01 PROCEDURE — D9220A PRA ANESTHESIA: ICD-10-PCS | Mod: CRNA,,, | Performed by: REGISTERED NURSE

## 2019-11-01 PROCEDURE — A9577 INJ MULTIHANCE: HCPCS | Performed by: SURGERY

## 2019-11-01 PROCEDURE — 25000003 PHARM REV CODE 250: Performed by: SURGERY

## 2019-11-01 PROCEDURE — D9220A PRA ANESTHESIA: Mod: CRNA,,, | Performed by: REGISTERED NURSE

## 2019-11-01 PROCEDURE — 25000003 PHARM REV CODE 250: Performed by: REGISTERED NURSE

## 2019-11-01 PROCEDURE — 63600175 PHARM REV CODE 636 W HCPCS: Performed by: SURGERY

## 2019-11-01 PROCEDURE — 19120 PR EXCISE BREAST CYST: ICD-10-PCS | Mod: RT,,, | Performed by: SURGERY

## 2019-11-01 PROCEDURE — 19120 REMOVAL OF BREAST LESION: CPT | Mod: RT,,, | Performed by: SURGERY

## 2019-11-01 PROCEDURE — 88307 TISSUE SPECIMEN TO PATHOLOGY - SURGERY: ICD-10-PCS | Mod: 26,,, | Performed by: PATHOLOGY

## 2019-11-01 PROCEDURE — 63600175 PHARM REV CODE 636 W HCPCS: Performed by: REGISTERED NURSE

## 2019-11-01 PROCEDURE — 36000707: Performed by: SURGERY

## 2019-11-01 PROCEDURE — 81025 URINE PREGNANCY TEST: CPT | Performed by: SURGERY

## 2019-11-01 PROCEDURE — 63600175 PHARM REV CODE 636 W HCPCS: Performed by: ANESTHESIOLOGY

## 2019-11-01 PROCEDURE — 88307 TISSUE EXAM BY PATHOLOGIST: CPT | Mod: 26,,, | Performed by: PATHOLOGY

## 2019-11-01 PROCEDURE — 77049 MRI BREAST C-+ W/CAD BI: CPT | Mod: 26,,, | Performed by: RADIOLOGY

## 2019-11-01 PROCEDURE — 76098 X-RAY EXAM SURGICAL SPECIMEN: CPT | Mod: TC,PO

## 2019-11-01 PROCEDURE — 37000008 HC ANESTHESIA 1ST 15 MINUTES: Performed by: SURGERY

## 2019-11-01 PROCEDURE — 77049 MRI BREAST C-+ W/CAD BI: CPT | Mod: TC

## 2019-11-01 PROCEDURE — 37000009 HC ANESTHESIA EA ADD 15 MINS: Performed by: SURGERY

## 2019-11-01 RX ORDER — PROPOFOL 10 MG/ML
VIAL (ML) INTRAVENOUS
Status: DISCONTINUED | OUTPATIENT
Start: 2019-11-01 | End: 2019-11-01

## 2019-11-01 RX ORDER — SODIUM CHLORIDE 9 MG/ML
INJECTION, SOLUTION INTRAVENOUS CONTINUOUS
Status: DISCONTINUED | OUTPATIENT
Start: 2019-11-01 | End: 2019-11-01 | Stop reason: HOSPADM

## 2019-11-01 RX ORDER — FENTANYL CITRATE 50 UG/ML
25 INJECTION, SOLUTION INTRAMUSCULAR; INTRAVENOUS EVERY 5 MIN PRN
Status: COMPLETED | OUTPATIENT
Start: 2019-11-01 | End: 2019-11-01

## 2019-11-01 RX ORDER — GLYCOPYRROLATE 0.2 MG/ML
INJECTION INTRAMUSCULAR; INTRAVENOUS
Status: DISCONTINUED | OUTPATIENT
Start: 2019-11-01 | End: 2019-11-01

## 2019-11-01 RX ORDER — LIDOCAINE HYDROCHLORIDE 10 MG/ML
1 INJECTION, SOLUTION EPIDURAL; INFILTRATION; INTRACAUDAL; PERINEURAL ONCE
Status: DISCONTINUED | OUTPATIENT
Start: 2019-11-01 | End: 2019-11-01 | Stop reason: HOSPADM

## 2019-11-01 RX ORDER — PROPOFOL 10 MG/ML
VIAL (ML) INTRAVENOUS CONTINUOUS PRN
Status: DISCONTINUED | OUTPATIENT
Start: 2019-11-01 | End: 2019-11-01

## 2019-11-01 RX ORDER — MIDAZOLAM HYDROCHLORIDE 1 MG/ML
INJECTION, SOLUTION INTRAMUSCULAR; INTRAVENOUS
Status: DISCONTINUED | OUTPATIENT
Start: 2019-11-01 | End: 2019-11-01

## 2019-11-01 RX ORDER — KETAMINE HCL IN 0.9 % NACL 50 MG/5 ML
SYRINGE (ML) INTRAVENOUS
Status: DISCONTINUED | OUTPATIENT
Start: 2019-11-01 | End: 2019-11-01

## 2019-11-01 RX ORDER — ONDANSETRON 2 MG/ML
4 INJECTION INTRAMUSCULAR; INTRAVENOUS DAILY PRN
Status: DISCONTINUED | OUTPATIENT
Start: 2019-11-01 | End: 2019-11-01 | Stop reason: HOSPADM

## 2019-11-01 RX ORDER — LIDOCAINE HYDROCHLORIDE 10 MG/ML
INJECTION, SOLUTION EPIDURAL; INFILTRATION; INTRACAUDAL; PERINEURAL
Status: DISCONTINUED | OUTPATIENT
Start: 2019-11-01 | End: 2019-11-01 | Stop reason: HOSPADM

## 2019-11-01 RX ORDER — ONDANSETRON 8 MG/1
8 TABLET, ORALLY DISINTEGRATING ORAL EVERY 8 HOURS PRN
Status: DISCONTINUED | OUTPATIENT
Start: 2019-11-01 | End: 2019-11-01 | Stop reason: HOSPADM

## 2019-11-01 RX ORDER — FENTANYL CITRATE 50 UG/ML
INJECTION, SOLUTION INTRAMUSCULAR; INTRAVENOUS
Status: DISCONTINUED | OUTPATIENT
Start: 2019-11-01 | End: 2019-11-01

## 2019-11-01 RX ORDER — HYDROCODONE BITARTRATE AND ACETAMINOPHEN 5; 325 MG/1; MG/1
1 TABLET ORAL EVERY 4 HOURS PRN
Status: DISCONTINUED | OUTPATIENT
Start: 2019-11-01 | End: 2019-11-01 | Stop reason: HOSPADM

## 2019-11-01 RX ORDER — CEFAZOLIN SODIUM 1 G/3ML
2 INJECTION, POWDER, FOR SOLUTION INTRAMUSCULAR; INTRAVENOUS
Status: COMPLETED | OUTPATIENT
Start: 2019-11-01 | End: 2019-11-01

## 2019-11-01 RX ORDER — SODIUM CHLORIDE 0.9 % (FLUSH) 0.9 %
2 SYRINGE (ML) INJECTION
Status: DISCONTINUED | OUTPATIENT
Start: 2019-11-01 | End: 2019-11-01 | Stop reason: HOSPADM

## 2019-11-01 RX ORDER — HYDROCODONE BITARTRATE AND ACETAMINOPHEN 5; 325 MG/1; MG/1
1 TABLET ORAL EVERY 6 HOURS PRN
Qty: 10 TABLET | Refills: 0 | Status: SHIPPED | OUTPATIENT
Start: 2019-11-01 | End: 2022-05-13

## 2019-11-01 RX ADMIN — FENTANYL CITRATE 25 MCG: 50 INJECTION INTRAMUSCULAR; INTRAVENOUS at 12:11

## 2019-11-01 RX ADMIN — FENTANYL CITRATE 25 MCG: 50 INJECTION, SOLUTION INTRAMUSCULAR; INTRAVENOUS at 11:11

## 2019-11-01 RX ADMIN — GADOBENATE DIMEGLUMINE 19 ML: 529 INJECTION, SOLUTION INTRAVENOUS at 08:11

## 2019-11-01 RX ADMIN — FENTANYL CITRATE 50 MCG: 50 INJECTION, SOLUTION INTRAMUSCULAR; INTRAVENOUS at 12:11

## 2019-11-01 RX ADMIN — GLYCOPYRROLATE 0.2 MG: 0.2 INJECTION, SOLUTION INTRAMUSCULAR; INTRAVENOUS at 11:11

## 2019-11-01 RX ADMIN — FENTANYL CITRATE 25 MCG: 50 INJECTION INTRAMUSCULAR; INTRAVENOUS at 01:11

## 2019-11-01 RX ADMIN — Medication 10 MG: at 11:11

## 2019-11-01 RX ADMIN — CEFAZOLIN 2 G: 330 INJECTION, POWDER, FOR SOLUTION INTRAMUSCULAR; INTRAVENOUS at 11:11

## 2019-11-01 RX ADMIN — SODIUM CHLORIDE: 0.9 INJECTION, SOLUTION INTRAVENOUS at 11:11

## 2019-11-01 RX ADMIN — MIDAZOLAM HYDROCHLORIDE 2 MG: 1 INJECTION, SOLUTION INTRAMUSCULAR; INTRAVENOUS at 11:11

## 2019-11-01 RX ADMIN — HYDROCODONE BITARTRATE AND ACETAMINOPHEN 1 TABLET: 5; 325 TABLET ORAL at 12:11

## 2019-11-01 RX ADMIN — PROPOFOL 40 MG: 10 INJECTION, EMULSION INTRAVENOUS at 11:11

## 2019-11-01 RX ADMIN — Medication 15 MG: at 11:11

## 2019-11-01 RX ADMIN — FENTANYL CITRATE 50 MCG: 50 INJECTION, SOLUTION INTRAMUSCULAR; INTRAVENOUS at 11:11

## 2019-11-01 RX ADMIN — PROPOFOL 150 MCG/KG/MIN: 10 INJECTION, EMULSION INTRAVENOUS at 11:11

## 2019-11-01 NOTE — TRANSFER OF CARE
"Anesthesia Transfer of Care Note    Patient: Carly Go    Procedure(s) Performed: Procedure(s) (LRB):  EXCISIONAL BIOPSY RIGHT with ultrasound (CONSENT AM OF) 1.0 hr case (Right)    Patient location: [facility]    Anesthesia Type: general    Transport from OR: Transported from OR on room air with adequate spontaneous ventilation    Post pain: adequate analgesia    Post assessment: tolerated procedure well and no apparent anesthetic complications    Post vital signs: stable    Level of consciousness: awake, alert and oriented    Nausea/Vomiting: no nausea/vomiting    Complications: none    Transfer of care protocol was followed      Last vitals:   Visit Vitals  BP (!) 155/89 (BP Location: Right arm, Patient Position: Lying)   Pulse 88   Temp 36.8 °C (98.3 °F)   Resp 18   Ht 5' 7" (1.702 m)   Wt 87.5 kg (193 lb)   LMP 10/19/2019 (Approximate)   SpO2 99%   Breastfeeding? No   BMI 30.23 kg/m²     "

## 2019-11-01 NOTE — OP NOTE
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 female brought to the operating room for definitive surgery of fibroepithelial lesion of the right breast.  The patient has elected to undergo right breast excisional biopsy.  The patient was informed of the possible risks and complications of the procedure, including but not limited to anesthetic risks, bleeding, infection, and need for additional surgery.  The patient concurred with the proposed plan, and has given informed consent.  The site of surgery was properly noted/marked in the preoperative holding area. Prior to incision, Ancef was administered.     She was then brought to the Operating Room and placed in the supine position. Anesthesia with local/monitored anesthesia care with sedation was administered.  The right breast, anterior chest, right arm and axilla were then prepped and draped in a sterile fashion.     We turned our attention to the right breast. An incision was made in the periareolar position of the right breast.  The specimen was dissected circumferentially around the lesion using palpation as a guide.   The lumpectomy specimen was oriented and submitted to pathology. The cavity was palpated for any additional findings and none were noted. Xray confirmed the clip and mass were removed.  It appeared close on ultrasound medially and deep so additional margin was taken there.    Within the lumpectomy cavity, hemostasis was achieved with cautery. The wound was irrigated until clear. There was no evidence of bleeding. It was closed in multiple layers with deep dermal and subcutaneous interrupted Vicryl sutures  and a running 4-0 Monocryl subcuticular skin closure.    Dermabond was applied. Sterile fluff gauze was placed and a post-procedure bra was placed. She tolerated the procedure well without complication and was turned over to Anesthesia for transport to the recovery area in a satisfactory condition. All specimens were sent to Pathology for permanent sectioning.    ESTIMATED BLOOD LOSS: 5 ml    COMPLICATIONS: none    DISPOSITION:  PACU--hemodynamically stable    ATTESTATION:  I performed the procedure.

## 2019-11-01 NOTE — PROGRESS NOTES
Patient assigned to this writer by charge nurse. The patient is in the waiting room but, will be escorted to [facility] room 12. This writer is completing a chart review and reviewing MD orders.

## 2019-11-01 NOTE — ANESTHESIA PREPROCEDURE EVALUATION
11/01/2019  Carly Go is a 34 y.o., female.    Pre-op Assessment    I have reviewed the Patient Summary Reports.     I have reviewed the Nursing Notes.   I have reviewed the Medications.     Review of Systems  Anesthesia Hx:  No problems with previous Anesthesia  Denies Family Hx of Anesthesia complications.   Denies Personal Hx of Anesthesia complications.   Social:  Non-Smoker, Social Alcohol Use  Denies Tobacco Use. Denies Alcohol Use.   Hematology/Oncology:  Hematology Normal        EENT/Dental:   Jaw Problems:  Clicking (TMJ) and Pt advised of risk for TMJ Occurrance   Cardiovascular:   Exercise tolerance: good Denies Hypertension.  Denies MI.  Denies CAD.    ECG has been reviewed. 11/2017 EKG Sinus tachycardia Denies Coronary Artery Disease.   Denies Hypertension.    Pulmonary:   Asthma asymptomatic Denies Shortness of breath.  Denies Recent URI.  Asthma:  last episode was Hx of Childhood Asthma. Emergency visits this year is none.  Oral/Intravenous steroid use is no prior steroid Rx. Current breathing status is optimal, free of wheezing.    Renal/:   Denies Chronic Renal Disease.   Denies Renal Symptoms/Infections/Stones  Denies Kidney Function/Disease    Hepatic/GI:   Denies GERD.  Denies Esophageal / Stomach Disorders  Denies Liver Disease    Musculoskeletal:   L5 S1  Spine Disorders: lumbar and cervical Spinal Stenosis, Lumbar Spinal Stenosis Cervical Spine Disorder, Cervical Disc Disease (s/p 3 level fusion for myelopathy), Radiculopathy, S/P Cervical Fusion, Cervical Spine Limited Mobility  Lumbar Spine Disorders, S/P Lumbar Fusion   Neurological:   Denies TIA. Denies CVA. Denies Seizures.  Denies Seizure Disorder  Denies CVA - Cerebrovasular Accident  Denies TIA - Transient Ischemic Attack    Endocrine:  Denies Diabetes  Thyroid Disease Recent bx of thyroid nodule   Psych:    Psychiatric History          Physical Exam  General:  Well nourished, Obesity    Airway/Jaw/Neck:  Airway Findings: Mouth Opening: Normal Tongue: Normal  General Airway Assessment: Adult  Mallampati: II  TM Distance: Normal, at least 6 cm  Jaw/Neck Findings: (Audible/Visial Clicking/locking of Jaw)  Neck ROM: Extension Decreased, Mild      Dental:  Dental Findings: In tact   Chest/Lungs:  Chest/Lungs Findings: Clear to auscultation, Normal Respiratory Rate     Heart/Vascular:  Heart Findings: Rate: Normal  Rhythm: Regular Rhythm  Sounds: Normal        Mental Status:  Mental Status Findings:  Cooperative, Alert and Oriented         Anesthesia Plan  Type of Anesthesia, risks & benefits discussed:  Anesthesia Type:  general  Patient's Preference:   Intra-op Monitoring Plan: standard ASA monitors  Intra-op Monitoring Plan Comments:   Post Op Pain Control Plan: multimodal analgesia, IV/PO Opioids PRN and per primary service following discharge from PACU  Post Op Pain Control Plan Comments:   Induction:   IV  Beta Blocker:  Patient is not currently on a Beta-Blocker (No further documentation required).       Informed Consent: Patient understands risks and agrees with Anesthesia plan.  Questions answered. Anesthesia consent signed with patient.  ASA Score: 2     Day of Surgery Review of History & Physical:    H&P update referred to the surgeon.         Ready For Surgery From Anesthesia Perspective.

## 2019-11-01 NOTE — PROGRESS NOTES
Pt discharged to home.  Discharge instructions given, pt and  stated understanding.  Dressing to breast dry and intact, IV removed.  Pt left via wheelchair with  to home.  Rx delivered to bedside.

## 2019-11-01 NOTE — ANESTHESIA POSTPROCEDURE EVALUATION
Anesthesia Post Evaluation    Patient: Carly Go    Procedure(s) Performed: Procedure(s) (LRB):  EXCISIONAL BIOPSY RIGHT BREAST (Right)    Final Anesthesia Type: MAC  Patient location during evaluation: [employer]  Patient participation: Yes- Able to Participate  Level of consciousness: awake and alert and oriented  Post-procedure vital signs: reviewed and stable  Pain management: adequate  Airway patency: patent  PONV status at discharge: No PONV  Anesthetic complications: no      Cardiovascular status: blood pressure returned to baseline and hemodynamically stable  Respiratory status: room air, spontaneous ventilation and unassisted  Hydration status: euvolemic  Follow-up not needed.          Vitals Value Taken Time   /74 11/1/2019  1:01 PM   Temp 37 °C (98.6 °F) 11/1/2019 12:21 PM   Pulse 101 11/1/2019  1:03 PM   Resp 26 11/1/2019  1:03 PM   SpO2 98 % 11/1/2019  1:03 PM   Vitals shown include unvalidated device data.      No case tracking events are documented in the log.      Pain/Abel Score: Pain Rating Prior to Med Admin: 6 (11/1/2019  1:00 PM)  Abel Score: 10 (11/1/2019 12:25 PM)

## 2019-11-01 NOTE — DISCHARGE SUMMARY
[facility]  Discharge Summary  General Surgery      Admit Date: 11/1/2019    Discharge Date and Time:  11/01/2019 12:20 PM    Attending Physician: Joi Blanchard MD     Discharge Provider: Joi Blanchard    Reason for Admission: surgery    Procedures Performed: Procedure(s) (LRB):  EXCISIONAL BIOPSY RIGHT with ultrasound (CONSENT AM OF) 1.0 hr case (Right)    Final Diagnoses:   Principal Problem: right fibroepithelial lesion     Discharged Condition: stable    Disposition: Home or Self Care    Follow Up/Patient Instructions: 7-14 days    Medications:  Reconciled Home Medications:      Medication List      START taking these medications    HYDROcodone-acetaminophen 5-325 mg per tablet  Commonly known as:  NORCO  Take 1 tablet by mouth every 6 (six) hours as needed for Pain.        CHANGE how you take these medications    buPROPion 150 MG TBSR 12 hr tablet  Commonly known as:  WELLBUTRIN SR  TAKE 1 TABLET (150 MG TOTAL) BY MOUTH ONCE DAILY.  What changed:    · how much to take  · how to take this  · when to take this        CONTINUE taking these medications    loratadine 10 mg tablet  Commonly known as:  CLARITIN  Take 10 mg by mouth once daily.     tiZANidine 4 MG tablet  Commonly known as:  ZANAFLEX  Take 4 mg by mouth once daily.     TYLENOL ARTHRITIS PAIN ORAL  Take by mouth daily as needed.     VITAMIN C 1000 MG tablet  Generic drug:  ascorbic acid (vitamin C)  Take 1,000 mg by mouth once daily.          Discharge Procedure Orders   Diet general     Lifting restrictions     Call MD for:  temperature >100.4     Call MD for:  persistent nausea and vomiting     Call MD for:  severe uncontrolled pain     Call MD for:  difficulty breathing, headache or visual disturbances     Call MD for:  redness, tenderness, or signs of infection (pain, swelling, redness, odor or green/yellow discharge around incision site)     Call MD for:  hives     Call MD for:  persistent dizziness or light-headedness     Call  MD for:  extreme fatigue     No dressing needed         Diet: regular    Activity: as tolerated

## 2019-11-01 NOTE — DISCHARGE INSTRUCTIONS
POSTOPERATIVE INSTRUCTIONS FOLLOWING BIOPSY OR LUMPECTOMY    The following are post-operative instructions that will help you to recover from your surgery.  Please read over these instructions carefully and contact us if we can answer any of your questions or concerns.    Dressing/breast binder (surgi-bra)  A surgical bra may be placed around your chest after your surgery.  If you are given the bra, please wear it as close to 24 hours a day as possible until your post-operative clinic appointment.  If the elastic around the bra irritates your skin, you may wear a soft t-shirt underneath the bra.  You may go without wearing the bra long enough to bath, to launder and dry the bra.  If the bra is extremely uncomfortable, you may wear a supportive sports bra instead after 2 days.  You may shower after 2 days.  Do not take a tub bath and do not soak the surgical site. Please do not remove the white strips of tape (steri-strips) that cover your incision.  They will be removed at your clinic visit.    Activity   You should be able to return to your regular activities 2 days after your surgery.  However, do not engage in strenuous activities in which you use your upper body such as:  golf, tennis, aerobics, washing windows, raking the yard, mopping, vacuuming, heavy lifting (e.g children) until you are seen for your follow-up appointment in clinic.    Medication for pain  You may find that over the counter pain medications may be sufficient for your pain.  You will be given a prescription for pain medication for more severe pain.  You should not drive or operate machinery while taking these.  Please take narcotics with food.  Narcotics can cause, or worse, constipation.  You will need to increase your fluid intake, eat high fiber foods (such as fruits and bran) and make sure that you are up and walking. You may need to take an over the counter stool softener for constipation.    Please report the following:   Temperature  greater than 101 degrees   Discharge or bad odor from the wound   Excessive bleeding, such as bloody dressing or extreme bruising   Redness at incision and/or drain sites   Swelling or buildup of fluid around incision     Additional information  I will see you approximately 2 weeks following your surgery.  If this follow-up appointment has not been made, please call the office.    If you have any questions or problems, please call my office or my nurse.    Dr. Joi Edward RN  [phone]    After hours and on weekends, you may call the main [facility] line at [phone] and ask to have the general surgery resident paged or have me paged.

## 2019-11-14 ENCOUNTER — OFFICE VISIT (OUTPATIENT)
Dept: SURGERY | Facility: CLINIC | Age: 34
End: 2019-11-14
Payer: COMMERCIAL

## 2019-11-14 VITALS
WEIGHT: 200.38 LBS | HEIGHT: 67 IN | TEMPERATURE: 98 F | SYSTOLIC BLOOD PRESSURE: 149 MMHG | BODY MASS INDEX: 31.45 KG/M2 | HEART RATE: 106 BPM | DIASTOLIC BLOOD PRESSURE: 89 MMHG

## 2019-11-14 DIAGNOSIS — D24.1 BENIGN PHYLLODES TUMOR OF BREAST, RIGHT: Primary | ICD-10-CM

## 2019-11-14 PROCEDURE — 99999 PR PBB SHADOW E&M-EST. PATIENT-LVL III: ICD-10-PCS | Mod: PBBFAC,,, | Performed by: SURGERY

## 2019-11-14 PROCEDURE — 99024 PR POST-OP FOLLOW-UP VISIT: ICD-10-PCS | Mod: S$GLB,,, | Performed by: SURGERY

## 2019-11-14 PROCEDURE — 99999 PR PBB SHADOW E&M-EST. PATIENT-LVL III: CPT | Mod: PBBFAC,,, | Performed by: SURGERY

## 2019-11-14 PROCEDURE — 99024 POSTOP FOLLOW-UP VISIT: CPT | Mod: S$GLB,,, | Performed by: SURGERY

## 2019-11-14 NOTE — PROGRESS NOTES
REFERRING PHYSICIAN:  No referring provider defined for this encounter.       Naya Garcia NP    DATE: 11/14/2019    DIAGNOSIS:    This is a 34 y.o. female with phyllodes of the right breast.    TREATMENT SUMMARY:  The patient is status post right excisional breast biopsy.  Final pathology showed benign phyllodes with negative margins.    INTERVAL HISTORY:   aCrly Go comes in for a post-op check.  She denies fever, chills, chest pain or shortness of breath.  Her pain is well controlled.   Some tenderness laterally.  Serosanguinous nipple discharge since surgery      PHYSICAL EXAMINATION:   General:  This is a well appearing female with appropriate speech, affect and gait.     Breast:  Incision clean, dry, and intact. Ultrasound shows seroma with extension towards nipple so may explain discharge.    IMPRESSION:   The patient has had an uneventful postoperative course.    PLAN:   1. return in 3 months for a follow up office visit and breast exam  2. bilateral mammogram in 11 months  3. The patient is advised in continued exam of the breast chest wall and to report to this office sooner should she note any areas of abnormality or concern.   4. Ultrasound right breast 6 months

## 2019-11-22 ENCOUNTER — OFFICE VISIT (OUTPATIENT)
Dept: OBSTETRICS AND GYNECOLOGY | Facility: CLINIC | Age: 34
End: 2019-11-22
Payer: COMMERCIAL

## 2019-11-22 VITALS
SYSTOLIC BLOOD PRESSURE: 142 MMHG | BODY MASS INDEX: 31.23 KG/M2 | HEIGHT: 67 IN | DIASTOLIC BLOOD PRESSURE: 88 MMHG | RESPIRATION RATE: 13 BRPM | WEIGHT: 199 LBS | HEART RATE: 72 BPM

## 2019-11-22 DIAGNOSIS — N92.1 MENORRHAGIA WITH IRREGULAR CYCLE: ICD-10-CM

## 2019-11-22 DIAGNOSIS — Z12.4 CERVICAL CANCER SCREENING: ICD-10-CM

## 2019-11-22 DIAGNOSIS — Z01.411 ENCOUNTER FOR GYNECOLOGICAL EXAMINATION WITH ABNORMAL FINDING: Primary | ICD-10-CM

## 2019-11-22 DIAGNOSIS — F41.9 ANXIETY: ICD-10-CM

## 2019-11-22 PROCEDURE — 99999 PR PBB SHADOW E&M-EST. PATIENT-LVL III: ICD-10-PCS | Mod: PBBFAC,,, | Performed by: OBSTETRICS & GYNECOLOGY

## 2019-11-22 PROCEDURE — 99999 PR PBB SHADOW E&M-EST. PATIENT-LVL III: CPT | Mod: PBBFAC,,, | Performed by: OBSTETRICS & GYNECOLOGY

## 2019-11-22 PROCEDURE — 99395 PR PREVENTIVE VISIT,EST,18-39: ICD-10-PCS | Mod: S$GLB,,, | Performed by: OBSTETRICS & GYNECOLOGY

## 2019-11-22 PROCEDURE — 99395 PREV VISIT EST AGE 18-39: CPT | Mod: S$GLB,,, | Performed by: OBSTETRICS & GYNECOLOGY

## 2019-11-22 PROCEDURE — 88175 CYTOPATH C/V AUTO FLUID REDO: CPT

## 2019-11-22 RX ORDER — NORGESTIMATE AND ETHINYL ESTRADIOL 0.25-0.035
1 KIT ORAL DAILY
Qty: 28 TABLET | Refills: 12 | Status: SHIPPED | OUTPATIENT
Start: 2019-11-22 | End: 2022-05-13

## 2019-11-22 RX ORDER — BUPROPION HYDROCHLORIDE 150 MG/1
TABLET, EXTENDED RELEASE ORAL
Qty: 60 TABLET | Refills: 11 | Status: SHIPPED | OUTPATIENT
Start: 2019-11-22 | End: 2022-05-13

## 2019-11-22 NOTE — PROGRESS NOTES
Subjective:       Patient ID: Carly Go is a 34 y.o. female.    Chief Complaint:  Well Woman      History of Present Illness  Patient presents for annual exam.  Patient recently was noted to have a breast mass and biopsy result was benign.  Patient reports that she is having 2 or 3 periods each month and has been doing so since delivery of her child 2 years ago.  Patient states that each 1 is heavy and has some associated cramping.  Patient states each.  Last 2-3 days.  Counseling was done.    Menstrual History:  OB History        3    Para   2    Term   2            AB   1    Living   2       SAB   0    TAB   1    Ectopic        Multiple   0    Live Births   2                Menarche age:  Patient's last menstrual period was 2019 (approximate).         Review of Systems  Review of Systems   Constitutional: Negative for activity change, appetite change, chills, diaphoresis, fatigue, fever and unexpected weight change.   HENT: Negative for congestion, dental problem, drooling, ear discharge, ear pain, facial swelling, hearing loss, mouth sores, nosebleeds, postnasal drip, rhinorrhea, sinus pressure, sneezing, sore throat, tinnitus, trouble swallowing and voice change.    Eyes: Negative for photophobia, pain, discharge, redness, itching and visual disturbance.   Respiratory: Negative for apnea, cough, choking, chest tightness, shortness of breath, wheezing and stridor.    Cardiovascular: Negative for chest pain, palpitations and leg swelling.   Gastrointestinal: Negative for abdominal distention, abdominal pain, anal bleeding, blood in stool, constipation, diarrhea, nausea, rectal pain and vomiting.   Endocrine: Negative for cold intolerance, heat intolerance, polydipsia, polyphagia and polyuria.   Genitourinary: Positive for menstrual problem. Negative for decreased urine volume, difficulty urinating, dyspareunia, dysuria, enuresis, flank pain, frequency, genital sores, hematuria,  pelvic pain, urgency, vaginal bleeding, vaginal discharge and vaginal pain.   Musculoskeletal: Positive for arthralgias, back pain, neck pain and neck stiffness. Negative for gait problem, joint swelling and myalgias.   Skin: Negative for color change, pallor, rash and wound.   Allergic/Immunologic: Positive for environmental allergies. Negative for food allergies and immunocompromised state.   Neurological: Positive for weakness, numbness and headaches. Negative for dizziness, tremors, seizures, syncope, facial asymmetry, speech difficulty and light-headedness.   Hematological: Negative for adenopathy. Does not bruise/bleed easily.   Psychiatric/Behavioral: Positive for agitation and sleep disturbance. Negative for behavioral problems, confusion, decreased concentration, dysphoric mood, hallucinations, self-injury and suicidal ideas. The patient is nervous/anxious. The patient is not hyperactive.            Objective:      Physical Exam   Constitutional: She is oriented to person, place, and time. She appears well-developed and well-nourished.   Neck: No thyromegaly present.   Cardiovascular: Normal rate and regular rhythm.   Pulmonary/Chest: Effort normal and breath sounds normal. Right breast exhibits no inverted nipple, no mass, no nipple discharge, no skin change and no tenderness. Left breast exhibits no inverted nipple, no mass, no nipple discharge, no skin change and no tenderness. No breast tenderness or discharge. Breasts are symmetrical.   Abdominal: Soft. Bowel sounds are normal. She exhibits no mass. There is no tenderness. Hernia confirmed negative in the right inguinal area and confirmed negative in the left inguinal area.   Genitourinary: Vagina normal. Rectal exam shows no external hemorrhoid. No breast tenderness or discharge. Uterus is tender. Uterus is not deviated, not enlarged and not fixed. Cervix exhibits no motion tenderness, no discharge and no friability. Right adnexum displays no mass, no  tenderness and no fullness. Left adnexum displays no mass, no tenderness and no fullness. No tenderness in the vagina. No foreign body in the vagina. No vaginal discharge found.   Musculoskeletal: Normal range of motion.   Lymphadenopathy:        Right: No inguinal adenopathy present.        Left: No inguinal adenopathy present.   Neurological: She is alert and oriented to person, place, and time. She has normal reflexes.   Skin: Skin is dry.   Psychiatric: She has a normal mood and affect. Her behavior is normal. Judgment and thought content normal.   Nursing note and vitals reviewed.          Assessment:        1. Encounter for gynecological examination with abnormal finding    2. Cervical cancer screening    3. Menorrhagia with irregular cycle                Plan:      Patient is considering treatment options.    Carly was seen today for well woman.    Diagnoses and all orders for this visit:    Encounter for gynecological examination with abnormal finding    Cervical cancer screening  -     Liquid-Based Pap Smear, Screening    Menorrhagia with irregular cycle

## 2019-12-07 LAB
FINAL PATHOLOGIC DIAGNOSIS: NORMAL
Lab: NORMAL

## 2021-05-04 ENCOUNTER — PATIENT MESSAGE (OUTPATIENT)
Dept: RESEARCH | Facility: HOSPITAL | Age: 36
End: 2021-05-04

## 2021-05-31 PROBLEM — M54.16 LUMBAR RADICULOPATHY, ACUTE: Status: ACTIVE | Noted: 2021-05-31

## 2021-05-31 PROBLEM — S39.012A STRAIN OF LUMBAR PARASPINAL MUSCLE, INITIAL ENCOUNTER: Status: ACTIVE | Noted: 2021-05-31

## 2022-09-27 ENCOUNTER — OFFICE VISIT (OUTPATIENT)
Dept: OBSTETRICS AND GYNECOLOGY | Facility: CLINIC | Age: 37
End: 2022-09-27
Attending: OBSTETRICS & GYNECOLOGY
Payer: COMMERCIAL

## 2022-09-27 ENCOUNTER — OFFICE VISIT (OUTPATIENT)
Dept: OBSTETRICS AND GYNECOLOGY | Facility: CLINIC | Age: 37
End: 2022-09-27
Payer: COMMERCIAL

## 2022-09-27 VITALS
BODY MASS INDEX: 33.53 KG/M2 | WEIGHT: 213.63 LBS | SYSTOLIC BLOOD PRESSURE: 150 MMHG | DIASTOLIC BLOOD PRESSURE: 92 MMHG | HEART RATE: 61 BPM | HEIGHT: 67 IN

## 2022-09-27 VITALS — DIASTOLIC BLOOD PRESSURE: 92 MMHG | HEART RATE: 64 BPM | SYSTOLIC BLOOD PRESSURE: 150 MMHG

## 2022-09-27 DIAGNOSIS — N92.1 MENORRHAGIA WITH IRREGULAR CYCLE: ICD-10-CM

## 2022-09-27 DIAGNOSIS — Z12.4 CERVICAL CANCER SCREENING: Primary | ICD-10-CM

## 2022-09-27 DIAGNOSIS — Z12.4 CERVICAL CANCER SCREENING: ICD-10-CM

## 2022-09-27 DIAGNOSIS — Z01.411 ENCOUNTER FOR GYNECOLOGICAL EXAMINATION WITH ABNORMAL FINDING: Primary | ICD-10-CM

## 2022-09-27 DIAGNOSIS — N94.10 DYSPAREUNIA IN FEMALE: ICD-10-CM

## 2022-09-27 DIAGNOSIS — N94.9 UTERINE TENDERNESS: ICD-10-CM

## 2022-09-27 PROCEDURE — 99395 PREV VISIT EST AGE 18-39: CPT | Mod: S$GLB,,, | Performed by: OBSTETRICS & GYNECOLOGY

## 2022-09-27 PROCEDURE — 3008F PR BODY MASS INDEX (BMI) DOCUMENTED: ICD-10-PCS | Mod: CPTII,S$GLB,, | Performed by: OBSTETRICS & GYNECOLOGY

## 2022-09-27 PROCEDURE — 99999 PR PBB SHADOW E&M-EST. PATIENT-LVL II: CPT | Mod: PBBFAC,,, | Performed by: OBSTETRICS & GYNECOLOGY

## 2022-09-27 PROCEDURE — 99999 PR PBB SHADOW E&M-EST. PATIENT-LVL II: ICD-10-PCS | Mod: PBBFAC,,, | Performed by: OBSTETRICS & GYNECOLOGY

## 2022-09-27 PROCEDURE — 88175 CYTOPATH C/V AUTO FLUID REDO: CPT | Performed by: OBSTETRICS & GYNECOLOGY

## 2022-09-27 PROCEDURE — 3080F PR MOST RECENT DIASTOLIC BLOOD PRESSURE >= 90 MM HG: ICD-10-PCS | Mod: CPTII,S$GLB,, | Performed by: OBSTETRICS & GYNECOLOGY

## 2022-09-27 PROCEDURE — 99395 PR PREVENTIVE VISIT,EST,18-39: ICD-10-PCS | Mod: S$GLB,,, | Performed by: OBSTETRICS & GYNECOLOGY

## 2022-09-27 PROCEDURE — 3077F SYST BP >= 140 MM HG: CPT | Mod: CPTII,S$GLB,, | Performed by: OBSTETRICS & GYNECOLOGY

## 2022-09-27 PROCEDURE — 1159F MED LIST DOCD IN RCRD: CPT | Mod: CPTII,S$GLB,, | Performed by: OBSTETRICS & GYNECOLOGY

## 2022-09-27 PROCEDURE — 1160F RVW MEDS BY RX/DR IN RCRD: CPT | Mod: CPTII,S$GLB,, | Performed by: OBSTETRICS & GYNECOLOGY

## 2022-09-27 PROCEDURE — 3077F PR MOST RECENT SYSTOLIC BLOOD PRESSURE >= 140 MM HG: ICD-10-PCS | Mod: CPTII,S$GLB,, | Performed by: OBSTETRICS & GYNECOLOGY

## 2022-09-27 PROCEDURE — 3080F DIAST BP >= 90 MM HG: CPT | Mod: CPTII,S$GLB,, | Performed by: OBSTETRICS & GYNECOLOGY

## 2022-09-27 PROCEDURE — 1159F PR MEDICATION LIST DOCUMENTED IN MEDICAL RECORD: ICD-10-PCS | Mod: CPTII,S$GLB,, | Performed by: OBSTETRICS & GYNECOLOGY

## 2022-09-27 PROCEDURE — 3008F BODY MASS INDEX DOCD: CPT | Mod: CPTII,S$GLB,, | Performed by: OBSTETRICS & GYNECOLOGY

## 2022-09-27 PROCEDURE — 1160F PR REVIEW ALL MEDS BY PRESCRIBER/CLIN PHARMACIST DOCUMENTED: ICD-10-PCS | Mod: CPTII,S$GLB,, | Performed by: OBSTETRICS & GYNECOLOGY

## 2022-09-27 NOTE — PROGRESS NOTES
Subjective:       Patient ID: Carly Go is a 37 y.o. female.    Chief Complaint:  Dysmenorrhea (Pt states she has pains in pelvic and increase pain after intercourse)      History of Present Illness  Patient presents for annual exam.  Patient does admit to very irregular cycles.  Patient states that some months she will skip her cycle and some months she will have a cycle and she never knows if it is going to last for several days are couple of weeks or even all month long.  Sometimes the bleeding is light and sometimes it is very heavy with the passage of clots the size of the palmar hand.  She also has cramping.  Also of significance these are to the patient is that she has slight discomfort with intercourse but pain the next day after intercourse and this is driving down her libido.  Counseling was done.    Menstrual History:  OB History          3    Para   2    Term   2            AB   1    Living   2         SAB   0    IAB   1    Ectopic        Multiple   0    Live Births   2                Menarche age:   Patient's last menstrual period was 2022.         Review of Systems  Review of Systems        Objective:      Physical Exam        Assessment:      No diagnosis found.            Plan:         There are no diagnoses linked to this encounter.

## 2022-10-06 LAB
FINAL PATHOLOGIC DIAGNOSIS: NORMAL
Lab: NORMAL

## 2025-02-12 ENCOUNTER — OFFICE VISIT (OUTPATIENT)
Dept: OBSTETRICS AND GYNECOLOGY | Facility: CLINIC | Age: 40
End: 2025-02-12
Payer: COMMERCIAL

## 2025-02-12 VITALS
DIASTOLIC BLOOD PRESSURE: 82 MMHG | WEIGHT: 217.38 LBS | SYSTOLIC BLOOD PRESSURE: 136 MMHG | BODY MASS INDEX: 34.12 KG/M2 | HEIGHT: 67 IN | HEART RATE: 96 BPM

## 2025-02-12 DIAGNOSIS — Z01.419 WELL WOMAN EXAM WITH ROUTINE GYNECOLOGICAL EXAM: Primary | ICD-10-CM

## 2025-02-12 DIAGNOSIS — N93.9 ABNORMAL UTERINE BLEEDING (AUB): ICD-10-CM

## 2025-02-12 DIAGNOSIS — Z12.4 CERVICAL CANCER SCREENING: ICD-10-CM

## 2025-02-12 DIAGNOSIS — L68.0 HIRSUTISM: ICD-10-CM

## 2025-02-12 DIAGNOSIS — Z12.31 BREAST CANCER SCREENING BY MAMMOGRAM: ICD-10-CM

## 2025-02-12 DIAGNOSIS — N94.10 DYSPAREUNIA IN FEMALE: ICD-10-CM

## 2025-02-12 PROCEDURE — 3075F SYST BP GE 130 - 139MM HG: CPT | Mod: CPTII,S$GLB,, | Performed by: OBSTETRICS & GYNECOLOGY

## 2025-02-12 PROCEDURE — 3079F DIAST BP 80-89 MM HG: CPT | Mod: CPTII,S$GLB,, | Performed by: OBSTETRICS & GYNECOLOGY

## 2025-02-12 PROCEDURE — 99999 PR PBB SHADOW E&M-EST. PATIENT-LVL III: CPT | Mod: PBBFAC,,, | Performed by: OBSTETRICS & GYNECOLOGY

## 2025-02-12 PROCEDURE — 1159F MED LIST DOCD IN RCRD: CPT | Mod: CPTII,S$GLB,, | Performed by: OBSTETRICS & GYNECOLOGY

## 2025-02-12 PROCEDURE — 99396 PREV VISIT EST AGE 40-64: CPT | Mod: S$GLB,,, | Performed by: OBSTETRICS & GYNECOLOGY

## 2025-02-12 PROCEDURE — 3008F BODY MASS INDEX DOCD: CPT | Mod: CPTII,S$GLB,, | Performed by: OBSTETRICS & GYNECOLOGY

## 2025-02-12 RX ORDER — TRAMADOL HYDROCHLORIDE 50 MG/1
50 TABLET ORAL EVERY 6 HOURS
COMMUNITY

## 2025-02-12 RX ORDER — SPIRONOLACTONE 25 MG/1
25 TABLET ORAL DAILY
Qty: 30 TABLET | Refills: 0 | Status: SHIPPED | OUTPATIENT
Start: 2025-02-12 | End: 2026-02-12

## 2025-02-12 RX ORDER — PREGABALIN 50 MG/1
50 CAPSULE ORAL 2 TIMES DAILY
COMMUNITY

## 2025-02-12 NOTE — PROGRESS NOTES
Subjective:    Patient ID: Carly Go is a 39 y.o. y.o. female.     Chief Complaint: Annual Well Woman Exam     History of Present Illness:  Carly presents today for Annual Well Woman exam. She describes her menses as  irregular and painful .She admits to pelvic pain.  She denies breast tenderness, masses, nipple discharge. She denies difficulty with urination or bowel movements. She is sexually active. Contraception is by no method.    Patient reports dyspareunia both deep and superficial. Cycles are irregular, lasting a few hours to a few weeks. She reports very heavy cycles, bleeding through a tampon and pad simultaneously. She reports RLQ severe pain, sudden onset. Associated with n/v.     Patient also reports worsening hirsutism.    The following portions of the patient's history were reviewed and updated as appropriate: allergies, current medications, past family history, past medical history, past social history, past surgical history and problem list.      Menstrual History:   Patient's last menstrual period was 2025 (exact date)..     OB History          3    Para   2    Term   2            AB   1    Living   2         SAB   0    IAB   1    Ectopic        Multiple   0    Live Births   2                 The following portions of the patient's history were reviewed and updated as appropriate: allergies, current medications, past family history, past medical history, past social history, past surgical history, and problem list.        ROS:     Review of Systems   Constitutional:  Positive for fatigue and unexpected weight change. Negative for activity change, appetite change, chills, diaphoresis and fever.   HENT:  Negative for mouth sores and tinnitus.    Eyes:  Negative for discharge and visual disturbance.   Respiratory:  Negative for cough, shortness of breath and wheezing.    Cardiovascular:  Negative for chest pain, palpitations and leg swelling.   Gastrointestinal:   "Negative for abdominal pain, bloating, blood in stool, constipation, diarrhea, nausea and vomiting.   Endocrine: Negative for diabetes, hair loss, hot flashes, hyperthyroidism and hypothyroidism.   Genitourinary:  Positive for dyspareunia, menstrual problem, pelvic pain and vaginal bleeding. Negative for dysuria, flank pain, frequency, genital sores, hematuria, urgency, vaginal discharge, vaginal pain, postcoital bleeding and vaginal odor.   Musculoskeletal:  Negative for back pain, joint swelling and myalgias.   Integumentary:  Positive for hair changes. Negative for rash, acne, breast mass, nipple discharge and breast skin changes.   Neurological:  Negative for seizures, syncope, numbness and headaches.   Hematological:  Negative for adenopathy. Does not bruise/bleed easily.   Psychiatric/Behavioral:  Negative for depression and sleep disturbance. The patient is not nervous/anxious.    Breast: Negative for mass, mastodynia, nipple discharge and skin changes      Objective:    Vital Signs:  Vitals:    02/12/25 1135   BP: 136/82   Pulse: 96   Weight: 98.6 kg (217 lb 6 oz)   Height: 5' 7" (1.702 m)         Physical Exam:  General:  alert, cooperative, appears stated age   Skin:  Skin color, texture, turgor normal. No rashes or lesions   HEENT:  conjunctivae/corneas clear. PERRL.   Neck: supple, trachea midline, no adenopathy or thyromegally   Respiratory:  clear to auscultation bilaterally   Heart:  regular rate and rhythm, S1, S2 normal, no murmur, click, rub or gallop   Breasts:  no discharge, erythema, or tenderness   Abdomen:  normal findings: bowel sounds normal, no masses palpable, no organomegaly and soft, non-tender   Pelvis: External genitalia: normal general appearance  Urinary system: urethral meatus normal, bladder nontender  Vaginal: normal mucosa without prolapse or lesions  Cervix: normal appearance  Uterus: normal size, shape, position  Adnexa: normal size, nontender bilaterally   Extremities: Normal " ROM; no edema, no cyanosis   Neurologial: Normal strength and tone. No focal numbness or weakness. Reflexes 2+ and equal.   Psychiatric: normal mood, speech, dress, and thought processes         Assessment:       Healthy female exam.     1. Well woman exam with routine gynecological exam    2. Cervical cancer screening    3. Breast cancer screening by mammogram    4. Abnormal uterine bleeding (AUB)    5. Dyspareunia in female    6. Hirsutism          Plan:       Thin prep Pap smear.  HPV cotesting  MMG ordered  TVUS ordered; patient desires hysterectomy  Rx of Aldactone     COUNSELING:  Carly was counseled on STD pevention, use and side-effects of various contraceptive measures, A.C.O.G. Pap guidelines and recommendations for yearly pelvic exams in addition to recommendations for monthly self breast exams; to see her PCP for other health maintenance.

## 2025-02-18 ENCOUNTER — RESULTS FOLLOW-UP (OUTPATIENT)
Dept: OBSTETRICS AND GYNECOLOGY | Facility: CLINIC | Age: 40
End: 2025-02-18

## 2025-02-18 ENCOUNTER — HOSPITAL ENCOUNTER (OUTPATIENT)
Dept: RADIOLOGY | Facility: HOSPITAL | Age: 40
Discharge: HOME | End: 2025-02-18
Attending: OBSTETRICS & GYNECOLOGY
Payer: COMMERCIAL

## 2025-02-18 VITALS — WEIGHT: 217 LBS | HEIGHT: 67 IN | BODY MASS INDEX: 34.06 KG/M2

## 2025-02-18 DIAGNOSIS — Z12.31 BREAST CANCER SCREENING BY MAMMOGRAM: ICD-10-CM

## 2025-02-18 DIAGNOSIS — N93.9 ABNORMAL UTERINE BLEEDING (AUB): ICD-10-CM

## 2025-02-18 DIAGNOSIS — N94.10 DYSPAREUNIA IN FEMALE: ICD-10-CM

## 2025-02-18 PROCEDURE — 77067 SCR MAMMO BI INCL CAD: CPT | Mod: TC

## 2025-02-18 PROCEDURE — 76830 TRANSVAGINAL US NON-OB: CPT | Mod: TC

## 2025-02-21 DIAGNOSIS — N94.10 DYSPAREUNIA IN FEMALE: ICD-10-CM

## 2025-02-21 DIAGNOSIS — N93.9 ABNORMAL UTERINE BLEEDING (AUB): Primary | ICD-10-CM

## 2025-02-26 ENCOUNTER — OFFICE VISIT (OUTPATIENT)
Dept: OBSTETRICS AND GYNECOLOGY | Facility: CLINIC | Age: 40
End: 2025-02-26
Payer: COMMERCIAL

## 2025-02-26 VITALS
HEIGHT: 67 IN | HEART RATE: 94 BPM | WEIGHT: 216.5 LBS | SYSTOLIC BLOOD PRESSURE: 132 MMHG | DIASTOLIC BLOOD PRESSURE: 90 MMHG | BODY MASS INDEX: 33.98 KG/M2

## 2025-02-26 DIAGNOSIS — N93.9 ABNORMAL UTERINE BLEEDING (AUB): Primary | ICD-10-CM

## 2025-02-26 DIAGNOSIS — N94.10 DYSPAREUNIA IN FEMALE: ICD-10-CM

## 2025-02-26 PROCEDURE — 99213 OFFICE O/P EST LOW 20 MIN: CPT | Mod: S$GLB,,, | Performed by: OBSTETRICS & GYNECOLOGY

## 2025-02-26 PROCEDURE — 1159F MED LIST DOCD IN RCRD: CPT | Mod: CPTII,S$GLB,, | Performed by: OBSTETRICS & GYNECOLOGY

## 2025-02-26 PROCEDURE — 3080F DIAST BP >= 90 MM HG: CPT | Mod: CPTII,S$GLB,, | Performed by: OBSTETRICS & GYNECOLOGY

## 2025-02-26 PROCEDURE — 3008F BODY MASS INDEX DOCD: CPT | Mod: CPTII,S$GLB,, | Performed by: OBSTETRICS & GYNECOLOGY

## 2025-02-26 PROCEDURE — 99999 PR PBB SHADOW E&M-EST. PATIENT-LVL III: CPT | Mod: PBBFAC,,, | Performed by: OBSTETRICS & GYNECOLOGY

## 2025-02-26 PROCEDURE — 3075F SYST BP GE 130 - 139MM HG: CPT | Mod: CPTII,S$GLB,, | Performed by: OBSTETRICS & GYNECOLOGY

## 2025-02-26 NOTE — PROGRESS NOTES
Subjective:    Patient ID: Carly Go is a 40 y.o. y.o. female.     Chief Complaint:   Chief Complaint   Patient presents with    Consult       History of Present Illness:  Carly presents today to discuss proceeding with a hysterectomy. Patient reports dyspareunia both deep and superficial. Cycles are irregular, lasting a few hours to a few weeks. She reports very heavy cycles, bleeding through a tampon and pad simultaneously. She reports RLQ severe pain, sudden onset. Associated with n/v.         ROS:   Review of Systems   Constitutional:  Negative for activity change, appetite change, chills, diaphoresis, fatigue, fever and unexpected weight change.   HENT:  Negative for mouth sores and tinnitus.    Eyes:  Negative for discharge and visual disturbance.   Respiratory:  Negative for cough, shortness of breath and wheezing.    Cardiovascular:  Negative for chest pain, palpitations and leg swelling.   Gastrointestinal:  Negative for abdominal pain, bloating, blood in stool, constipation, diarrhea, nausea and vomiting.   Endocrine: Negative for diabetes, hair loss, hot flashes, hyperthyroidism and hypothyroidism.   Genitourinary:  Positive for dyspareunia, menorrhagia and menstrual problem. Negative for dysuria, flank pain, frequency, genital sores, hematuria, urgency, vaginal bleeding, vaginal discharge, vaginal pain, postcoital bleeding and vaginal odor.   Musculoskeletal:  Negative for back pain, joint swelling and myalgias.   Integumentary:  Negative for rash, acne, breast mass, nipple discharge and breast skin changes.   Neurological:  Negative for seizures, syncope, numbness and headaches.   Hematological:  Negative for adenopathy. Does not bruise/bleed easily.   Psychiatric/Behavioral:  Negative for depression and sleep disturbance. The patient is not nervous/anxious.    Breast: Negative for mass, mastodynia, nipple discharge and skin changes          Objective:    Vital Signs:  Vitals:     02/26/25 1403   BP: (!) 132/90   Pulse: 94       Physical Exam:  General:  alert, cooperative, no distress   Head Normocephalic, without obvious abnormality, atraumatic   Neck .supple, symmetrical, trachea midline   Skin:  Skin color, texture, turgor normal. No rashes or lesions   Abdomen:  soft, non-tender; bowel sounds normal   Pelvis: Deferred   Extremities extremities normal, atraumatic, no cyanosis or edema   Neurologic Grossly normal        Abnormal uterine bleeding (AUB)    Dyspareunia in female       Plan for TLH/BS

## 2025-02-27 NOTE — TELEPHONE ENCOUNTER
[facility] BS scheduled for 3/26/25 with pre-op, pre-admit and post op appointments scheduled and discussed with patient.  Instructed to arrive through ER entrance of [facility] at 6:30a day of surgery.  Pre-admit nurse will call day before if there is a time change. Will need someone to drive pt to and from hospital.  Pt denies cardiac history, stroke, stents or valve replacements. Medication list reviewed with patient.  Case request number [id number].  Meredith in surgery department notified of date and time.   Instructed on the following:    WEEK BEFORE SURGERY:  STOP ALL NSAIDS (ibuprofen, Aleve, Aspirin, etc) 7 days prior to your surgery.  NOTIFY your doctor if you are on any of the following medications listed below:  Dulaglutide (Trulicity)  Liraglutide (Victoza)  Semaglutide (Ozempic, Wegovy, Rybelsus)  Exenatide (Byetta, Bydureon)  Lixisenatide (Adlyxin)  Tirzepatide (Mounjaro)  Phentermine (Adipex)  These medications should be stopped 7 days prior to scheduled surgery. Patient verbalized understanding.

## 2025-02-27 NOTE — TELEPHONE ENCOUNTER
----- Message from Alia Rodriguez MD sent at 2/21/2025  3:55 PM CST -----  I sent this to you already correct?  ----- Message -----  From: Austin Martin LPN  Sent: 2/21/2025   3:14 PM CST  To: Alia Rodriguez MD    ----- Message from Austin Martin LPN sent at 2/21/2025  3:14 PM CST -----

## 2025-03-07 DIAGNOSIS — L68.0 HIRSUTISM: ICD-10-CM

## 2025-03-07 NOTE — TELEPHONE ENCOUNTER
Refill Routing Note   Medication(s) are not appropriate for processing by [facility] for the following reason(s):        Required vitals abnormal  New or recently adjusted medication  Required labs outdated    [facility] action(s):  Defer               Appointments  past 12m or future 3m with PCP    Date Provider   Last Visit   2/26/2025 Alia Rodriguez MD   Next Visit   3/13/2025 Alia Rodriguez MD   ED visits in past 90 days: 0        Note composed:11:33 AM 03/07/2025

## 2025-03-10 RX ORDER — SPIRONOLACTONE 25 MG/1
25 TABLET ORAL
Qty: 90 TABLET | Refills: 1 | Status: SHIPPED | OUTPATIENT
Start: 2025-03-10

## 2025-03-13 ENCOUNTER — OFFICE VISIT (OUTPATIENT)
Dept: OBSTETRICS AND GYNECOLOGY | Facility: CLINIC | Age: 40
End: 2025-03-13
Payer: COMMERCIAL

## 2025-03-13 VITALS
SYSTOLIC BLOOD PRESSURE: 130 MMHG | HEIGHT: 67 IN | HEART RATE: 108 BPM | DIASTOLIC BLOOD PRESSURE: 74 MMHG | BODY MASS INDEX: 33.91 KG/M2

## 2025-03-13 DIAGNOSIS — Z01.818 PRE-OPERATIVE EXAM: ICD-10-CM

## 2025-03-13 DIAGNOSIS — N93.9 ABNORMAL UTERINE BLEEDING (AUB): Primary | ICD-10-CM

## 2025-03-13 DIAGNOSIS — N94.10 DYSPAREUNIA IN FEMALE: ICD-10-CM

## 2025-03-13 PROCEDURE — 99999 PR PBB SHADOW E&M-EST. PATIENT-LVL III: CPT | Mod: PBBFAC,,, | Performed by: OBSTETRICS & GYNECOLOGY

## 2025-03-13 RX ORDER — MEPERIDINE HYDROCHLORIDE 25 MG/ML
12.5 INJECTION INTRAMUSCULAR; INTRAVENOUS; SUBCUTANEOUS ONCE AS NEEDED
Refills: 0 | OUTPATIENT
Start: 2025-03-13 | End: 2025-03-14

## 2025-03-13 RX ORDER — PROCHLORPERAZINE EDISYLATE 5 MG/ML
5 INJECTION INTRAMUSCULAR; INTRAVENOUS EVERY 6 HOURS PRN
OUTPATIENT
Start: 2025-03-13

## 2025-03-13 RX ORDER — DIPHENHYDRAMINE HCL 25 MG
25 CAPSULE ORAL EVERY 4 HOURS PRN
OUTPATIENT
Start: 2025-03-13

## 2025-03-13 RX ORDER — HYDROMORPHONE HYDROCHLORIDE 1 MG/ML
1 INJECTION, SOLUTION INTRAMUSCULAR; INTRAVENOUS; SUBCUTANEOUS EVERY 4 HOURS PRN
Refills: 0 | OUTPATIENT
Start: 2025-03-13

## 2025-03-13 RX ORDER — LIDOCAINE HYDROCHLORIDE 10 MG/ML
0.5 INJECTION, SOLUTION EPIDURAL; INFILTRATION; INTRACAUDAL; PERINEURAL
OUTPATIENT
Start: 2025-03-13

## 2025-03-13 RX ORDER — ONDANSETRON 8 MG/1
8 TABLET, ORALLY DISINTEGRATING ORAL EVERY 8 HOURS PRN
OUTPATIENT
Start: 2025-03-13

## 2025-03-13 RX ORDER — HYDROCODONE BITARTRATE AND ACETAMINOPHEN 10; 325 MG/1; MG/1
1 TABLET ORAL EVERY 4 HOURS PRN
Refills: 0 | OUTPATIENT
Start: 2025-03-13

## 2025-03-13 RX ORDER — AMOXICILLIN 250 MG
1 CAPSULE ORAL 2 TIMES DAILY
OUTPATIENT
Start: 2025-03-13

## 2025-03-13 RX ORDER — PROCHLORPERAZINE EDISYLATE 5 MG/ML
5 INJECTION INTRAMUSCULAR; INTRAVENOUS EVERY 10 MIN PRN
OUTPATIENT
Start: 2025-03-13

## 2025-03-13 RX ORDER — FAMOTIDINE 20 MG/1
20 TABLET, FILM COATED ORAL
OUTPATIENT
Start: 2025-03-13

## 2025-03-13 RX ORDER — ONDANSETRON HYDROCHLORIDE 2 MG/ML
4 INJECTION, SOLUTION INTRAVENOUS DAILY PRN
OUTPATIENT
Start: 2025-03-13

## 2025-03-13 RX ORDER — HYDROCODONE BITARTRATE AND ACETAMINOPHEN 5; 325 MG/1; MG/1
1 TABLET ORAL EVERY 4 HOURS PRN
Refills: 0 | OUTPATIENT
Start: 2025-03-13

## 2025-03-13 RX ORDER — POLYETHYLENE GLYCOL 3350 17 G/17G
17 POWDER, FOR SOLUTION ORAL DAILY
OUTPATIENT
Start: 2025-03-13

## 2025-03-13 RX ORDER — SODIUM CHLORIDE 0.9 % (FLUSH) 0.9 %
3 SYRINGE (ML) INJECTION
OUTPATIENT
Start: 2025-03-13

## 2025-03-13 RX ORDER — SODIUM CHLORIDE, SODIUM LACTATE, POTASSIUM CHLORIDE, CALCIUM CHLORIDE 600; 310; 30; 20 MG/100ML; MG/100ML; MG/100ML; MG/100ML
INJECTION, SOLUTION INTRAVENOUS CONTINUOUS
OUTPATIENT
Start: 2025-03-13

## 2025-03-13 RX ORDER — ACETAMINOPHEN 500 MG
1000 TABLET ORAL
OUTPATIENT
Start: 2025-03-13

## 2025-03-13 RX ORDER — PREGABALIN 50 MG/1
50 CAPSULE ORAL
OUTPATIENT
Start: 2025-03-13

## 2025-03-13 RX ORDER — DIPHENHYDRAMINE HYDROCHLORIDE 50 MG/ML
25 INJECTION, SOLUTION INTRAMUSCULAR; INTRAVENOUS EVERY 4 HOURS PRN
OUTPATIENT
Start: 2025-03-13

## 2025-03-13 RX ORDER — MUPIROCIN 20 MG/G
OINTMENT TOPICAL
OUTPATIENT
Start: 2025-03-13

## 2025-03-13 RX ORDER — ACETAMINOPHEN 325 MG/1
650 TABLET ORAL EVERY 4 HOURS PRN
OUTPATIENT
Start: 2025-03-13

## 2025-03-13 RX ORDER — HYDROMORPHONE HYDROCHLORIDE 1 MG/ML
0.2 INJECTION, SOLUTION INTRAMUSCULAR; INTRAVENOUS; SUBCUTANEOUS EVERY 5 MIN PRN
Refills: 0 | OUTPATIENT
Start: 2025-03-13

## 2025-03-13 NOTE — H&P (VIEW-ONLY)
Carly Go is a 40 y.o. female  for preop examination.  She is scheduled for a TLH/BS on 3/26.    ROS:  GENERAL: Denies weight gain or weight loss. Feeling well overall.   SKIN: Denies rash or lesions.   HEAD: Denies head injury or headache.   NODES: Denies enlarged lymph nodes.   CHEST: Denies chest pain or shortness of breath.   CARDIOVASCULAR: Denies palpitations or left sided chest pain.   ABDOMEN: No abdominal pain, constipation, diarrhea, nausea, vomiting or rectal bleeding.   URINARY: No frequency, dysuria, hematuria, or burning on urination.  REPRODUCTIVE: See HPI.   BREASTS: The patient performs breast self-examination and denies pain, lumps, or nipple discharge.   HEMATOLOGIC: No easy bruisability or excessive bleeding with the exception of menstrual cycles.  MUSCULOSKELETAL: Denies joint pain or swelling.   NEUROLOGIC: Denies syncope or weakness.   PSYCHIATRIC: Denies depression, anxiety or mood swings.    Past Medical History:   Diagnosis Date    Abnormal Pap smear of cervix age 19    no treatment    Asthma     Breast cancer     Encounter for blood transfusion     Lumbar stenosis 2017    Mental disorder     anxiety, depression    Thyroid disease 2017    thyroid mass, recent biopsy     Past Surgical History:   Procedure Laterality Date    BACK SURGERY      lumbar laminectomy    DILATION AND CURETTAGE OF UTERUS  2004    EXCISIONAL BIOPSY Right 2019    Procedure: EXCISIONAL BIOPSY RIGHT BREAST;  Surgeon: Joi Blanchard MD;  Location: [facility] OR [address] [place] [address];  Service: General;  Laterality: Right;    NECK SURGERY  2022    OOPHORECTOMY  2009    Left    TUBAL LIGATION  2016     Family History   Problem Relation Name Age of Onset    Breast cancer Maternal Grandmother  53    Colon cancer Maternal Grandmother      Ovarian cancer Maternal Grandmother      Colon cancer Father  56    Breast cancer Mother  35     Review of patient's allergies indicates:   Allergen Reactions    Aspirin Anaphylaxis     Nsaids (non-steroidal anti-inflammatory drug) Anaphylaxis    Adhesive Blisters     Steri-strips,Paper tape ok     Current Medications[1]  No outpatient medications have been marked as taking for the 3/13/25 encounter (Office Visit) with Alia Rodriguez MD.     Social History[2]      Last pap NEM/HPV +  Last ultrasound The uterus measures 8.4 x 5.1 x 6.2 cm. The tibia stripe measures 9 mm. A few calcifications are present along the endometrial stripe, nonspecific. Both ovaries are normal in size and appearance with normal blood flow present. A physiologic amount of pelvic free fluid is present.       Vitals:    03/13/25 1210   BP: 130/74   Pulse: 108     General Appearance: Alert, appropriate appearance for age. No acute distress, Chest/Respiratory Exam: Normal chest wall and respirations. Clear to auscultation.   Cardiovascular Exam: regular rate and rhythm, S1, S2 normal, no murmur, click, rub or gallop   Gastrointestinal Exam: soft, non-tender; bowel sounds normal; no masses,  no organomegaly  Pelvic Exam Female: Exam deferred.   Psychiatric Exam: Alert and oriented, appropriate affect.    Assessment:   AUB  Dyspareunia     Plan:   TLH/BS  Pre-op appt  Pre-op labs  Consents     I have discussed the risks, benefits, indications, and alternatives of the procedure in detail.  The patient verbalizes her understanding.  All questions answered.  Consents signed.  The patient agrees to proceed to proceed as planned.                 [1]   Current Outpatient Medications:     ACETAMINOPHEN (TYLENOL ARTHRITIS PAIN ORAL), Take by mouth daily as needed., Disp: , Rfl:     ascorbic acid, vitamin C, (VITAMIN C) 1000 MG tablet, Take 1,000 mg by mouth once daily., Disp: , Rfl:     cyclobenzaprine (FLEXERIL) 10 MG tablet, Take 10 mg by mouth 3 (three) times daily., Disp: , Rfl:     pregabalin (LYRICA) 50 MG capsule, Take 50 mg by mouth 2 (two) times daily., Disp: , Rfl:     spironolactone (ALDACTONE) 25 MG tablet, TAKE 1  TABLET BY MOUTH EVERY DAY, Disp: 90 tablet, Rfl: 1    traMADoL (ULTRAM) 50 mg tablet, Take 50 mg by mouth every 6 (six) hours., Disp: , Rfl:   [2]   Social History  Tobacco Use    Smoking status: Never     Passive exposure: Never    Smokeless tobacco: Never   Substance Use Topics    Alcohol use: Yes     Comment: occasionally    Drug use: No

## 2025-03-13 NOTE — PROGRESS NOTES
Carly Go is a 40 y.o. female  for preop examination.  She is scheduled for a TLH/BS on 3/26.    ROS:  GENERAL: Denies weight gain or weight loss. Feeling well overall.   SKIN: Denies rash or lesions.   HEAD: Denies head injury or headache.   NODES: Denies enlarged lymph nodes.   CHEST: Denies chest pain or shortness of breath.   CARDIOVASCULAR: Denies palpitations or left sided chest pain.   ABDOMEN: No abdominal pain, constipation, diarrhea, nausea, vomiting or rectal bleeding.   URINARY: No frequency, dysuria, hematuria, or burning on urination.  REPRODUCTIVE: See HPI.   BREASTS: The patient performs breast self-examination and denies pain, lumps, or nipple discharge.   HEMATOLOGIC: No easy bruisability or excessive bleeding with the exception of menstrual cycles.  MUSCULOSKELETAL: Denies joint pain or swelling.   NEUROLOGIC: Denies syncope or weakness.   PSYCHIATRIC: Denies depression, anxiety or mood swings.    Past Medical History:   Diagnosis Date    Abnormal Pap smear of cervix age 19    no treatment    Asthma     Breast cancer     Encounter for blood transfusion     Lumbar stenosis 2017    Mental disorder     anxiety, depression    Thyroid disease 2017    thyroid mass, recent biopsy     Past Surgical History:   Procedure Laterality Date    BACK SURGERY      lumbar laminectomy    DILATION AND CURETTAGE OF UTERUS  2004    EXCISIONAL BIOPSY Right 2019    Procedure: EXCISIONAL BIOPSY RIGHT BREAST;  Surgeon: Joi Blanchard MD;  Location: [facility] OR [address] [place] [address];  Service: General;  Laterality: Right;    NECK SURGERY  2022    OOPHORECTOMY  2009    Left    TUBAL LIGATION  2016     Family History   Problem Relation Name Age of Onset    Breast cancer Maternal Grandmother  53    Colon cancer Maternal Grandmother      Ovarian cancer Maternal Grandmother      Colon cancer Father  56    Breast cancer Mother  35     Review of patient's allergies indicates:   Allergen Reactions    Aspirin Anaphylaxis     Nsaids (non-steroidal anti-inflammatory drug) Anaphylaxis    Adhesive Blisters     Steri-strips,Paper tape ok     Current Medications[1]  No outpatient medications have been marked as taking for the 3/13/25 encounter (Office Visit) with Alia Rodriguez MD.     Social History[2]      Last pap NEM/HPV +  Last ultrasound The uterus measures 8.4 x 5.1 x 6.2 cm. The tibia stripe measures 9 mm. A few calcifications are present along the endometrial stripe, nonspecific. Both ovaries are normal in size and appearance with normal blood flow present. A physiologic amount of pelvic free fluid is present.       Vitals:    03/13/25 1210   BP: 130/74   Pulse: 108     General Appearance: Alert, appropriate appearance for age. No acute distress, Chest/Respiratory Exam: Normal chest wall and respirations. Clear to auscultation.   Cardiovascular Exam: regular rate and rhythm, S1, S2 normal, no murmur, click, rub or gallop   Gastrointestinal Exam: soft, non-tender; bowel sounds normal; no masses,  no organomegaly  Pelvic Exam Female: Exam deferred.   Psychiatric Exam: Alert and oriented, appropriate affect.    Assessment:   AUB  Dyspareunia     Plan:   TLH/BS  Pre-op appt  Pre-op labs  Consents     I have discussed the risks, benefits, indications, and alternatives of the procedure in detail.  The patient verbalizes her understanding.  All questions answered.  Consents signed.  The patient agrees to proceed to proceed as planned.                 [1]   Current Outpatient Medications:     ACETAMINOPHEN (TYLENOL ARTHRITIS PAIN ORAL), Take by mouth daily as needed., Disp: , Rfl:     ascorbic acid, vitamin C, (VITAMIN C) 1000 MG tablet, Take 1,000 mg by mouth once daily., Disp: , Rfl:     cyclobenzaprine (FLEXERIL) 10 MG tablet, Take 10 mg by mouth 3 (three) times daily., Disp: , Rfl:     pregabalin (LYRICA) 50 MG capsule, Take 50 mg by mouth 2 (two) times daily., Disp: , Rfl:     spironolactone (ALDACTONE) 25 MG tablet, TAKE 1  TABLET BY MOUTH EVERY DAY, Disp: 90 tablet, Rfl: 1    traMADoL (ULTRAM) 50 mg tablet, Take 50 mg by mouth every 6 (six) hours., Disp: , Rfl:   [2]   Social History  Tobacco Use    Smoking status: Never     Passive exposure: Never    Smokeless tobacco: Never   Substance Use Topics    Alcohol use: Yes     Comment: occasionally    Drug use: No

## 2025-03-24 ENCOUNTER — TELEPHONE (OUTPATIENT)
Dept: OBSTETRICS AND GYNECOLOGY | Facility: CLINIC | Age: 40
End: 2025-03-24
Payer: COMMERCIAL

## 2025-03-24 NOTE — TELEPHONE ENCOUNTER
----- Message from Med Assistant Ordoñez sent at 3/24/2025  3:45 PM CDT -----  Contact: self  Carly Ko: [id number] Phone      Not on file.Work Phone      Not on file.Mobile          [phone] Care Team:Naya Britt NP as PCP - General (Family Medicine)Joel Schaefer MD as Obstetrician (Obstetrics)Albaro-Alia Wilkins MD as Consulting Physician (Obstetrics and Gynecology)OB? NoWt phone number can you be reached at? [phone]: scheduled for hysterectomy on Thursday with Dr Irvin, stated was seen in ER yesterday for high bp and was given clonidine patch.  Wants to know if this will affect her surgery.

## 2025-03-24 NOTE — TELEPHONE ENCOUNTER
Pt was prescribed xanax and a clonidine patch, pt wanted to know if she should take the patch off for surgery or leave it on and should she take her xanax or hold it as well, pls advise. Pt aware Dr. Irvin is out until tomorrow.

## 2025-03-25 ENCOUNTER — HOSPITAL ENCOUNTER (OUTPATIENT)
Dept: PREADMISSION TESTING | Facility: HOSPITAL | Age: 40
Discharge: HOME | End: 2025-03-25
Attending: OBSTETRICS & GYNECOLOGY
Payer: COMMERCIAL

## 2025-03-25 RX ORDER — CLONIDINE 0.2 MG/24H
1 PATCH, EXTENDED RELEASE TRANSDERMAL
COMMUNITY

## 2025-03-25 NOTE — DISCHARGE INSTRUCTIONS
[facility]  Pre Admit Instructions    Day and Date of Procedure:      Call your doctor if you become ill, have an infection, or are taking antibiotics before your surgery  Someone will call you between 1 p.m. And 5 p.m. the workday before the procedure to give you an arrival time       - If your procedure is before 7 a.m. enter through Emergency Room door and check in with them       - 7 a.m. to 5 p.m. Enter through main entrance and check in with registration  You must have a responsible  to bring you home  Only one person will be allowed per patient due to Covid-19 restrictions  You must wear a mask at all times. If you do not have a mask, we will provide you with one. No Exception.   Cafeteria hours for guest: 7am to 10am; 11am to 1:30 pm.    Do NOT eat anything past:   Clear liquids until:  No dairy, creamers, gum or mints the morning of your procedure  You must drink one sports drink before _______    Please    Do not wear makeup, jewelry, nail polish or body piercings  Bring containers/solution for contacts, dentures, bridges - these and hearing aids will be removed before your procedure  Do not bring cash, jewelry or valuables the day of your procedure   No smoking at least 24 hours before your procedure  Wear clothing that is comfortable and easy to take off and put on  Do NOT shave for at least 5 days before your surgery    PRE-OP Hibiclens bath Instructions:    Shower with Hibiclens the Night before and morning of the procedure.   Wash your face with your regular cleanser. DO NOT use Hibiclens on your face.  Thoroughly rinse your body with warm water from the neck down  Apply Hibiclens directly to your skin or on a wet washcloth and wash gently. Do not stand under the water while washing with Hibiclens as you will   remove the wash too soon.  Rinse thoroughly with warm water  DO NOT use your regular soap after you have bathed with the Hibiclens  Do not apply lotion or deodorant   Put on  a clean pair of clothes after each bath          Information about your stay (Please Review)    Before Surgery  Your doctor may order and review labs, x-rays, ECG or other tests as a pre-surgery workup and will call you if there is need for follow up.  If you did not get a Covid test before your procedure, one will be done when you arrive. You must have a negative Covid test result before your procedure.  No smoking inside or outside the hospital. You must leave the campus to smoke.  Wear clothing that is easy to take off and put on.  The hospital will provide you with a gown.  You may bring robe, slippers, nightwear, and toiletries (toothbrush, toothpaste, makeup) if needed.  If your doctor orders a Fleets Enema or other prep, follow package and/or doctors orders.  Brush your teeth and rinse your mouth the morning of surgery, but dont swallow the water.  The nurse will ask questions and check your condition.  The doctor may qing your surgical site.  Compression boots will be placed on your calves to reduce the risk of blood clots.  An IV will be started in pre-procedure area.  The doctor may order medicine to help you relax before surgery.  After Surgery  After you recover in post-procedure area you will go to the medicine floor to recover for a few more hours.  The nurse will check your temperature, breathing, blood pressure, heart rate, IV site, and surgery site.  A diet will be ordered-most start with ice chips and then advance slowly to other foods.  If you have IV fluids the IV pump will beep to let the nurse know that she needs to check it.  You may have a urinary catheter and staff may measure your oral intake and urine output.  Pain medication may be ordered by the doctor after surgery.  If you have a pain management device tell your caretakers not to press the button because of OVERDOSE RISK.  When the nurse or doctor tells you it is okay to get out of bed, ask for help until you are stable.  The nurse  may ask you to turn, cough, and deep breathe to prevent lung problems.  You can use a pillow to hold your incision when you deep breathe or cough to reduce pain.  The nurse will give you discharge instructions--incision care, symptoms to report to your doctor, and your follow-up appointment when you are discharged. You cannot drive yourself home.  Only one person is allowed during your stay due to Covid-19 restrictions. Visiting hours are 8 am to 6 pm.    Goals for Discharge from One Day Surgery  Control pain with an oral medication  Walk without feeling dizzy or weak  Tolerate liquids well  Urinate without difficulty    Things you can do to  Reduce the Risk of Infections or Complications  Wash Hands and use Waterless Hand Sanitizers  Wash hands frequently with soap and warm water for at least 15 seconds.   Use hand sanitizers (alcohol based) often at home and in public if hands are not visibly soiled  Take Antibiotic Exactly as Prescribed  Do not stop antibiotics too soon; you risk developing infection resistant to antibiotics  Take your antibiotic even if you are feeling better and even if they upset your stomach  Call the doctor if you cant tolerate the antibiotic or you have an allergic reaction  Stay Healthy  Take medicines as prescribed by your doctor  Keep your diabetes under control - diet and medication  Get enough rest, exercise and eat a healthy diet  Keep the Wound Clean and Dry  Wash hands before and after taking care of the incision (cut)  Wash hands when you remove a dressing, before you touch/apply a new dressing  Shower and clean incision with antibacterial soap and rinse well if the doctor approves  Allow the cut to dry completely before putting on a clean dressing  Do not touch the part of the bandage that will cover the incision  Do not use ointments unless your doctor tells you to-can promote bacterial growth  If ordered, put ointment directly on the dressing-do not touch the end of the  tube  Do not scrub, remove scabs, or leave a damp dressing on the incision  Do not use peroxide or alcohol to clean the incision unless the doctor tells you to   Do not let children, pets or anyone else contaminate the incision  Stop Smoking To Prevent Infection  Stop smoking-Centers for Disease Control recommends 30 days before surgery  Smokers get more infections after surgery-studies have shown 6 times the risk  Smokers have more scarring and heal slower-open wounds get infected easier  Prevent Respiratory complications  Stop smoking  Turn, cough, and deep breathe even if you have some pain when you do so.  Splint your incision with a pillow when you cough/deep breath, to help control pain.  Do not lie in one position for long periods of time.   Prevent Blood Clots  When you wake move your legs, flex your feet, rotate your ankles, wiggle your toes  Get up when the doctor says its ok.  Dangle your feet from the side of the bed  Report symptoms-leg pain, redness/swelling, warm to touch; fever; shortness of breath, chest pain, severe upper back pain.

## 2025-03-26 ENCOUNTER — ANESTHESIA (OUTPATIENT)
Dept: SURGERY | Facility: HOSPITAL | Age: 40
End: 2025-03-26
Payer: COMMERCIAL

## 2025-03-26 ENCOUNTER — HOSPITAL ENCOUNTER (OUTPATIENT)
Facility: HOSPITAL | Age: 40
Discharge: HOME | End: 2025-03-26
Attending: OBSTETRICS & GYNECOLOGY | Admitting: OBSTETRICS & GYNECOLOGY
Payer: COMMERCIAL

## 2025-03-26 ENCOUNTER — ANESTHESIA EVENT (OUTPATIENT)
Dept: SURGERY | Facility: HOSPITAL | Age: 40
End: 2025-03-26
Payer: COMMERCIAL

## 2025-03-26 DIAGNOSIS — N93.9 ABNORMAL UTERINE BLEEDING (AUB): ICD-10-CM

## 2025-03-26 DIAGNOSIS — Z01.818 PRE-OPERATIVE EXAM: ICD-10-CM

## 2025-03-26 DIAGNOSIS — N94.10 DYSPAREUNIA IN FEMALE: ICD-10-CM

## 2025-03-26 LAB
GLUCOSE SERPL-MCNC: 87 MG/DL (ref 70–110)
POCT GLUCOSE: 87 MG/DL (ref 70–110)

## 2025-03-26 PROCEDURE — 58571 TLH W/T/O 250 G OR LESS: CPT | Mod: 80,,, | Performed by: OBSTETRICS & GYNECOLOGY

## 2025-03-26 PROCEDURE — 88307 TISSUE EXAM BY PATHOLOGIST: CPT | Mod: TC | Performed by: OBSTETRICS & GYNECOLOGY

## 2025-03-26 PROCEDURE — 88307 TISSUE EXAM BY PATHOLOGIST: CPT | Mod: 26,,, | Performed by: PATHOLOGY

## 2025-03-26 PROCEDURE — 71000039 HC RECOVERY, EACH ADD'L HOUR: Performed by: OBSTETRICS & GYNECOLOGY

## 2025-03-26 PROCEDURE — 82962 GLUCOSE BLOOD TEST: CPT | Performed by: OBSTETRICS & GYNECOLOGY

## 2025-03-26 PROCEDURE — 37000008 HC ANESTHESIA 1ST 15 MINUTES: Performed by: OBSTETRICS & GYNECOLOGY

## 2025-03-26 PROCEDURE — 27201423 OPTIME MED/SURG SUP & DEVICES STERILE SUPPLY: Performed by: OBSTETRICS & GYNECOLOGY

## 2025-03-26 PROCEDURE — 71000033 HC RECOVERY, INTIAL HOUR: Performed by: OBSTETRICS & GYNECOLOGY

## 2025-03-26 PROCEDURE — 36000711: Performed by: OBSTETRICS & GYNECOLOGY

## 2025-03-26 PROCEDURE — 36000710: Performed by: OBSTETRICS & GYNECOLOGY

## 2025-03-26 PROCEDURE — 58571 TLH W/T/O 250 G OR LESS: CPT | Mod: ,,, | Performed by: OBSTETRICS & GYNECOLOGY

## 2025-03-26 PROCEDURE — 37000009 HC ANESTHESIA EA ADD 15 MINS: Performed by: OBSTETRICS & GYNECOLOGY

## 2025-03-26 PROCEDURE — 25000003 PHARM REV CODE 250

## 2025-03-26 PROCEDURE — 25000003 PHARM REV CODE 250: Performed by: OBSTETRICS & GYNECOLOGY

## 2025-03-26 PROCEDURE — 63600175 PHARM REV CODE 636 W HCPCS: Mod: JZ,TB | Performed by: NURSE ANESTHETIST, CERTIFIED REGISTERED

## 2025-03-26 PROCEDURE — 63600175 PHARM REV CODE 636 W HCPCS

## 2025-03-26 PROCEDURE — 63600175 PHARM REV CODE 636 W HCPCS: Performed by: OBSTETRICS & GYNECOLOGY

## 2025-03-26 RX ORDER — FAMOTIDINE 20 MG/1
20 TABLET, FILM COATED ORAL
Status: COMPLETED | OUTPATIENT
Start: 2025-03-26 | End: 2025-03-26

## 2025-03-26 RX ORDER — ONDANSETRON HYDROCHLORIDE 2 MG/ML
INJECTION, SOLUTION INTRAMUSCULAR; INTRAVENOUS
Status: DISCONTINUED | OUTPATIENT
Start: 2025-03-26 | End: 2025-03-26

## 2025-03-26 RX ORDER — SODIUM CHLORIDE 0.9 % (FLUSH) 0.9 %
3 SYRINGE (ML) INJECTION
Status: DISCONTINUED | OUTPATIENT
Start: 2025-03-26 | End: 2025-03-26 | Stop reason: HOSPADM

## 2025-03-26 RX ORDER — SODIUM CHLORIDE, SODIUM LACTATE, POTASSIUM CHLORIDE, CALCIUM CHLORIDE 600; 310; 30; 20 MG/100ML; MG/100ML; MG/100ML; MG/100ML
INJECTION, SOLUTION INTRAVENOUS CONTINUOUS
Status: DISCONTINUED | OUTPATIENT
Start: 2025-03-26 | End: 2025-03-26 | Stop reason: HOSPADM

## 2025-03-26 RX ORDER — ROPIVACAINE HYDROCHLORIDE 2 MG/ML
INJECTION, SOLUTION EPIDURAL; INFILTRATION CONTINUOUS PRN
Status: DISCONTINUED | OUTPATIENT
Start: 2025-03-26 | End: 2025-03-26

## 2025-03-26 RX ORDER — LIDOCAINE HYDROCHLORIDE 20 MG/ML
INJECTION, SOLUTION EPIDURAL; INFILTRATION; INTRACAUDAL; PERINEURAL
Status: DISCONTINUED | OUTPATIENT
Start: 2025-03-26 | End: 2025-03-26

## 2025-03-26 RX ORDER — HYDROMORPHONE HYDROCHLORIDE 1 MG/ML
INJECTION, SOLUTION INTRAMUSCULAR; INTRAVENOUS; SUBCUTANEOUS
Status: DISCONTINUED | OUTPATIENT
Start: 2025-03-26 | End: 2025-03-26

## 2025-03-26 RX ORDER — ROCURONIUM BROMIDE 10 MG/ML
INJECTION, SOLUTION INTRAVENOUS
Status: DISCONTINUED | OUTPATIENT
Start: 2025-03-26 | End: 2025-03-26

## 2025-03-26 RX ORDER — FENTANYL CITRATE 50 UG/ML
INJECTION, SOLUTION INTRAMUSCULAR; INTRAVENOUS
Status: DISCONTINUED | OUTPATIENT
Start: 2025-03-26 | End: 2025-03-26

## 2025-03-26 RX ORDER — DIPHENHYDRAMINE HCL 25 MG
25 CAPSULE ORAL EVERY 4 HOURS PRN
Status: DISCONTINUED | OUTPATIENT
Start: 2025-03-26 | End: 2025-03-26 | Stop reason: HOSPADM

## 2025-03-26 RX ORDER — PROPOFOL 10 MG/ML
VIAL (ML) INTRAVENOUS
Status: DISCONTINUED | OUTPATIENT
Start: 2025-03-26 | End: 2025-03-26

## 2025-03-26 RX ORDER — MIDAZOLAM HYDROCHLORIDE 1 MG/ML
INJECTION INTRAMUSCULAR; INTRAVENOUS
Status: DISCONTINUED | OUTPATIENT
Start: 2025-03-26 | End: 2025-03-26

## 2025-03-26 RX ORDER — PROCHLORPERAZINE EDISYLATE 5 MG/ML
5 INJECTION INTRAMUSCULAR; INTRAVENOUS EVERY 10 MIN PRN
Status: DISCONTINUED | OUTPATIENT
Start: 2025-03-26 | End: 2025-03-26 | Stop reason: HOSPADM

## 2025-03-26 RX ORDER — HYDROCODONE BITARTRATE AND ACETAMINOPHEN 10; 325 MG/1; MG/1
1 TABLET ORAL EVERY 4 HOURS PRN
Refills: 0 | Status: DISCONTINUED | OUTPATIENT
Start: 2025-03-26 | End: 2025-03-26 | Stop reason: HOSPADM

## 2025-03-26 RX ORDER — SUCCINYLCHOLINE CHLORIDE 20 MG/ML
INJECTION INTRAMUSCULAR; INTRAVENOUS
Status: DISCONTINUED | OUTPATIENT
Start: 2025-03-26 | End: 2025-03-26

## 2025-03-26 RX ORDER — HYDROMORPHONE HYDROCHLORIDE 1 MG/ML
1 INJECTION, SOLUTION INTRAMUSCULAR; INTRAVENOUS; SUBCUTANEOUS EVERY 4 HOURS PRN
Refills: 0 | Status: DISCONTINUED | OUTPATIENT
Start: 2025-03-26 | End: 2025-03-26 | Stop reason: HOSPADM

## 2025-03-26 RX ORDER — DIPHENHYDRAMINE HYDROCHLORIDE 50 MG/ML
25 INJECTION, SOLUTION INTRAMUSCULAR; INTRAVENOUS EVERY 4 HOURS PRN
Status: DISCONTINUED | OUTPATIENT
Start: 2025-03-26 | End: 2025-03-26 | Stop reason: HOSPADM

## 2025-03-26 RX ORDER — DEXAMETHASONE SODIUM PHOSPHATE 4 MG/ML
INJECTION, SOLUTION INTRA-ARTICULAR; INTRALESIONAL; INTRAMUSCULAR; INTRAVENOUS; SOFT TISSUE
Status: DISCONTINUED | OUTPATIENT
Start: 2025-03-26 | End: 2025-03-26

## 2025-03-26 RX ORDER — HYDRALAZINE HYDROCHLORIDE 20 MG/ML
5 INJECTION INTRAMUSCULAR; INTRAVENOUS ONCE
Status: DISCONTINUED | OUTPATIENT
Start: 2025-03-26 | End: 2025-03-26 | Stop reason: HOSPADM

## 2025-03-26 RX ORDER — BUPIVACAINE HYDROCHLORIDE AND EPINEPHRINE 5; 5 MG/ML; UG/ML
INJECTION, SOLUTION EPIDURAL; INTRACAUDAL; PERINEURAL
Status: DISCONTINUED | OUTPATIENT
Start: 2025-03-26 | End: 2025-03-26 | Stop reason: HOSPADM

## 2025-03-26 RX ORDER — PROCHLORPERAZINE EDISYLATE 5 MG/ML
5 INJECTION INTRAMUSCULAR; INTRAVENOUS EVERY 6 HOURS PRN
Status: DISCONTINUED | OUTPATIENT
Start: 2025-03-26 | End: 2025-03-26 | Stop reason: HOSPADM

## 2025-03-26 RX ORDER — ACETAMINOPHEN 500 MG
1000 TABLET ORAL
Status: COMPLETED | OUTPATIENT
Start: 2025-03-26 | End: 2025-03-26

## 2025-03-26 RX ORDER — ACETAMINOPHEN 325 MG/1
650 TABLET ORAL EVERY 4 HOURS PRN
Status: DISCONTINUED | OUTPATIENT
Start: 2025-03-26 | End: 2025-03-26 | Stop reason: HOSPADM

## 2025-03-26 RX ORDER — POLYETHYLENE GLYCOL 3350 17 G/17G
17 POWDER, FOR SOLUTION ORAL DAILY
Status: DISCONTINUED | OUTPATIENT
Start: 2025-03-26 | End: 2025-03-26 | Stop reason: HOSPADM

## 2025-03-26 RX ORDER — AMOXICILLIN 250 MG
1 CAPSULE ORAL 2 TIMES DAILY
Status: DISCONTINUED | OUTPATIENT
Start: 2025-03-26 | End: 2025-03-26 | Stop reason: HOSPADM

## 2025-03-26 RX ORDER — HYDROCODONE BITARTRATE AND ACETAMINOPHEN 5; 325 MG/1; MG/1
1 TABLET ORAL EVERY 4 HOURS PRN
Qty: 14 TABLET | Refills: 0 | Status: SHIPPED | OUTPATIENT
Start: 2025-03-26

## 2025-03-26 RX ORDER — MEPERIDINE HYDROCHLORIDE 25 MG/ML
12.5 INJECTION INTRAMUSCULAR; INTRAVENOUS; SUBCUTANEOUS ONCE AS NEEDED
Refills: 0 | Status: DISCONTINUED | OUTPATIENT
Start: 2025-03-26 | End: 2025-03-26 | Stop reason: HOSPADM

## 2025-03-26 RX ORDER — LIDOCAINE HYDROCHLORIDE 10 MG/ML
0.5 INJECTION, SOLUTION EPIDURAL; INFILTRATION; INTRACAUDAL; PERINEURAL
Status: DISCONTINUED | OUTPATIENT
Start: 2025-03-26 | End: 2025-03-26 | Stop reason: HOSPADM

## 2025-03-26 RX ORDER — MUPIROCIN 20 MG/G
OINTMENT TOPICAL
Status: DISCONTINUED | OUTPATIENT
Start: 2025-03-26 | End: 2025-03-26 | Stop reason: HOSPADM

## 2025-03-26 RX ORDER — HYDROCODONE BITARTRATE AND ACETAMINOPHEN 5; 325 MG/1; MG/1
1 TABLET ORAL EVERY 4 HOURS PRN
Refills: 0 | Status: DISCONTINUED | OUTPATIENT
Start: 2025-03-26 | End: 2025-03-26 | Stop reason: HOSPADM

## 2025-03-26 RX ORDER — PREGABALIN 50 MG/1
50 CAPSULE ORAL
Status: COMPLETED | OUTPATIENT
Start: 2025-03-26 | End: 2025-03-26

## 2025-03-26 RX ORDER — CEFAZOLIN 2 G/1
2 INJECTION, POWDER, FOR SOLUTION INTRAMUSCULAR; INTRAVENOUS
Status: COMPLETED | OUTPATIENT
Start: 2025-03-26 | End: 2025-03-26

## 2025-03-26 RX ORDER — HYDROMORPHONE HYDROCHLORIDE 1 MG/ML
0.2 INJECTION, SOLUTION INTRAMUSCULAR; INTRAVENOUS; SUBCUTANEOUS EVERY 5 MIN PRN
Refills: 0 | Status: DISCONTINUED | OUTPATIENT
Start: 2025-03-26 | End: 2025-03-26 | Stop reason: HOSPADM

## 2025-03-26 RX ORDER — ONDANSETRON HYDROCHLORIDE 2 MG/ML
4 INJECTION, SOLUTION INTRAVENOUS DAILY PRN
Status: DISCONTINUED | OUTPATIENT
Start: 2025-03-26 | End: 2025-03-26 | Stop reason: HOSPADM

## 2025-03-26 RX ORDER — ONDANSETRON 8 MG/1
8 TABLET, ORALLY DISINTEGRATING ORAL EVERY 8 HOURS PRN
Status: DISCONTINUED | OUTPATIENT
Start: 2025-03-26 | End: 2025-03-26 | Stop reason: HOSPADM

## 2025-03-26 RX ORDER — PHENYLEPHRINE HYDROCHLORIDE 10 MG/ML
INJECTION INTRAVENOUS
Status: DISCONTINUED | OUTPATIENT
Start: 2025-03-26 | End: 2025-03-26

## 2025-03-26 RX ADMIN — POLYETHYLENE GLYCOL 3350 17 G: 17 POWDER, FOR SOLUTION ORAL at 10:03

## 2025-03-26 RX ADMIN — ROCURONIUM BROMIDE 5 MG: 10 INJECTION, SOLUTION INTRAVENOUS at 07:03

## 2025-03-26 RX ADMIN — PROPOFOL 160 MG: 10 INJECTION, EMULSION INTRAVENOUS at 07:03

## 2025-03-26 RX ADMIN — LIDOCAINE HYDROCHLORIDE 100 MG: 20 INJECTION, SOLUTION EPIDURAL; INFILTRATION; INTRACAUDAL; PERINEURAL at 07:03

## 2025-03-26 RX ADMIN — SENNOSIDES AND DOCUSATE SODIUM 1 TABLET: 8.6; 5 TABLET ORAL at 10:03

## 2025-03-26 RX ADMIN — ACETAMINOPHEN 1000 MG: 500 TABLET ORAL at 06:03

## 2025-03-26 RX ADMIN — PREGABALIN 50 MG: 50 CAPSULE ORAL at 06:03

## 2025-03-26 RX ADMIN — ONDANSETRON 8 MG: 2 INJECTION INTRAMUSCULAR; INTRAVENOUS at 07:03

## 2025-03-26 RX ADMIN — ROCURONIUM BROMIDE 45 MG: 10 INJECTION, SOLUTION INTRAVENOUS at 07:03

## 2025-03-26 RX ADMIN — FENTANYL CITRATE 50 MCG: 0.05 INJECTION, SOLUTION INTRAMUSCULAR; INTRAVENOUS at 07:03

## 2025-03-26 RX ADMIN — FAMOTIDINE 20 MG: 20 TABLET, FILM COATED ORAL at 06:03

## 2025-03-26 RX ADMIN — MIDAZOLAM HYDROCHLORIDE 2 MG: 1 INJECTION, SOLUTION INTRAMUSCULAR; INTRAVENOUS at 07:03

## 2025-03-26 RX ADMIN — SUCCINYLCHOLINE CHLORIDE 160 MG: 20 INJECTION, SOLUTION INTRAMUSCULAR; INTRAVENOUS at 07:03

## 2025-03-26 RX ADMIN — SODIUM CHLORIDE, SODIUM LACTATE, POTASSIUM CHLORIDE, AND CALCIUM CHLORIDE: .6; .31; .03; .02 INJECTION, SOLUTION INTRAVENOUS at 07:03

## 2025-03-26 RX ADMIN — HYDROCODONE BITARTRATE AND ACETAMINOPHEN 1 TABLET: 10; 325 TABLET ORAL at 10:03

## 2025-03-26 RX ADMIN — CEFAZOLIN 2 G: 2 INJECTION, POWDER, FOR SOLUTION INTRAMUSCULAR; INTRAVENOUS at 07:03

## 2025-03-26 RX ADMIN — PHENYLEPHRINE HYDROCHLORIDE 100 MCG: 10 INJECTION INTRAVENOUS at 07:03

## 2025-03-26 RX ADMIN — DEXAMETHASONE SODIUM PHOSPHATE 8 MG: 4 INJECTION, SOLUTION INTRAMUSCULAR; INTRAVENOUS at 07:03

## 2025-03-26 RX ADMIN — HYDROMORPHONE HYDROCHLORIDE 1 MG: 1 INJECTION, SOLUTION INTRAMUSCULAR; INTRAVENOUS; SUBCUTANEOUS at 09:03

## 2025-03-26 RX ADMIN — PROPOFOL 40 MG: 10 INJECTION, EMULSION INTRAVENOUS at 07:03

## 2025-03-26 RX ADMIN — HYDROMORPHONE HYDROCHLORIDE 1 MG: 1 INJECTION, SOLUTION INTRAMUSCULAR; INTRAVENOUS; SUBCUTANEOUS at 08:03

## 2025-03-26 NOTE — OP NOTE
Date: 03/26/2025    Procedure: TLH/BS     Surgeon: Alia Rodriguez MD    Assistant: Carole Vaca MD    Pre-op Dx:   AUB  Dyspareunia     Post-op Dx: same    Anesthesia: GETA    EBL: <10 cc    DVT prophylaxis: Bilateral sequential compression devices    Perioperative antibiotics: Ancef    Specimen: uterus, cervix, bilateral fallopian tubes    Operative Findings: Enlarged uterus ~ 10 weeks, boggy. Normal tubes with evidence of ligation. Normal ovaries; both present.     Complications: none    PROCEDURE IN DETAIL:     The patient was  transferred to the Operating Room, where general endotracheal anesthesia was administered without complication. She was placed in the dorsal lithotomy position in [facility]. The patient's abdomen and perineum were prepped in the normal sterile fashion. A Brannon catheter was inserted into the bladder. The patient was draped.  A 10 mm skin incision was made in the patient's infraumbilical region. Veress needle was inserted into this location with proper intraperitoneal placement, confirmed with the saline drop test and low intraabdominal pressure.  The abdomen was insufflated. The 11 mm infraumbilical trocar was inserted under direct laparoscopic visualization. Next, two 10 mm skin incisions were made in the patient's lower lateral abdomen bilaterally. 10 mm trocars were placed  under direct laparoscopic visualization. The patient was placed in steep Trendelenburg position. The abdomen and pelvis were surveyed with the above noted findings.  The energy device was used to coagulate the patient's uteroovarian ligament and fallopian tube on the left. The patient's round ligament was also coagulated on the left. The energy device  was used to create a bladder flap. This was done on the patient's right by the assistant in which the energy device was also used to coagulate and cut the fallopian tube and uteroovarian ligament followed by the round ligament and then created the bladder  flap. Next, the peritoneum was further dissected anteriorly and posteriorly. The uterine arteries were skeletonized and then coagulated using the energy device . They were cut with the energy device bilaterally.The energy device was used to cut along the sponge stick both anterior and posterior to create a colpotomy. The energy device was then used to further coagulate the cardinal ligaments. The uterus and cervix was then amputated free from the vagina and the uterus was then removed vaginally and a vaginal packing was placed.  V-lock endo-stitch was used to close the entire cuff length. Next, the ports were removed under direct laparoscopic visualization with excellent hemostasis noted. Skin incisions were closed using a #3-0 Vicryl in a running fashion. Dermoplast was placed on the skin incision. Brannon catheter was removed. All instruments were removed. Sponge and instrument count was correct x2. The patient tolerated the procedure well and was transferred to recovery in stable condition.

## 2025-03-26 NOTE — ANESTHESIA PROCEDURE NOTES
Epidural    Patient location during procedure: OB   Reason for block: primary anesthetic   Reason for block: labor analgesia requested by patient and obstetrician  Diagnosis: pregnancy   Start time: 3/26/2025 7:05 AM  Timeout: 3/26/2025 7:04 AM  End time: 3/26/2025 7:07 AM  Surgery related to: pregnancy    Staffing  Performing Provider: Desmond Charlton CRNA  Authorizing Provider: Desmond Charlton CRNA    Staffing  Performed by: Desmond Charlton, TANVIR  Authorized by: Desmond Charlton CRNA        Preanesthetic Checklist  Completed: patient identified, IV checked, site marked, risks and benefits discussed, surgical consent, monitors and equipment checked, pre-op evaluation, timeout performed, anesthesia consent given, hand hygiene performed and patient being monitored  Preparation  Patient position: sitting  Prep: ChloraPrep  Patient monitoring: Pulse Ox and Blood Pressure  Reason for block: primary anesthetic   Epidural  Skin Anesthetic: lidocaine 1%  Skin Wheal: 3 mL  Administration type: single shot  Approach: midline  Interspace: L2-3    Injection technique: JESSICA saline  Needle and Epidural Catheter  Needle type: Tuohy   Needle gauge: 18  Needle length: 3.5 inches  Needle insertion depth: 8 cm  Catheter type: multi-orifice  Catheter size: 20 G  Catheter at skin depth: 12 cm  Insertion Attempts: 1  Test dose: 5 mL of lidocaine 1.5% with Epi 1-to-200,000  Additional Documentation: incremental injection, negative aspiration for heme and CSF, no paresthesia on injection, no signs/symptoms of IV or SA injection, no significant complaints from patient and no significant pain on injection  Needle localization: anatomical landmarks  Assessment  Upper dermatomal levels - Left: T10  Right: T10   Dermatomal levels determined by pinch or prick  Ease of block: easy  Patient's tolerance of the procedure: comfortable throughout block and no complaints No inadvertent dural puncture with Tuohy.  Dural puncture performed with spinal  needle.

## 2025-03-26 NOTE — PROGRESS NOTES
03/26/25 1205   Vital Signs   Pulse 84   Heart Rate Source Monitor   Resp 18   BP (!) 144/79   MAP (mmHg) 103   BP Location Right arm   BP Method Automatic   Patient Position Lying   Assessments (Pre/Post)   Level of Consciousness (AVPU) alert     Patient awake, alert, oriented. Room air. Peripheral IV removed. Catheter tip intact. Patient voiding without difficulty. Patient tolerating pain with PO medication. Medication delivered to bedside per pharmacy. Patient tolerating regular diet. Scant vaginal bleeding noted. Dr. Irvin updated on patient status via telephone. MD okay with discharge. VSS. Incisions x3 clean, dry, and intact. AVS given and explained to patient and spouse. Patient and spouse verbalized understanding.

## 2025-03-26 NOTE — ANESTHESIA POSTPROCEDURE EVALUATION
Anesthesia Post Evaluation    Patient: Carly Go    Procedure(s) Performed: Procedure(s) (LRB):  HYSTERECTOMY, TOTAL, LAPAROSCOPIC (N/A)  SALPINGECTOMY, LAPAROSCOPIC (Bilateral)    Final Anesthesia Type: general      Patient location during evaluation: PACU  Patient participation: Yes- Able to Participate  Level of consciousness: awake and alert, oriented and awake  Post-procedure vital signs: reviewed and stable  Pain management: adequate  Airway patency: patent    PONV status at discharge: No PONV  Anesthetic complications: no      Cardiovascular status: blood pressure returned to baseline  Respiratory status: unassisted, spontaneous ventilation and room air  Hydration status: euvolemic  Follow-up not needed.  Comments: [facility]              Vitals Value Taken Time   /101 03/26/25 10:00   Temp 36.2 °C (97.2 °F) 03/26/25 08:25   Pulse 70 03/26/25 10:00   Resp 16 03/26/25 10:00   SpO2 98 % 03/26/25 10:00         No case tracking events are documented in the log.      Pain/Abel Score: Pain Rating Prior to Med Admin: 4 (neck pain) (3/26/2025  6:59 AM)  Abel Score: 8 (3/26/2025  9:22 AM)

## 2025-03-26 NOTE — DISCHARGE SUMMARY
[facility]  Obstetrics & Gynecology  Discharge Summary    Patient Name: Carly Go  MRN: [id number]  Admission Date: 3/26/2025  Hospital Length of Stay: 0 days  Discharge Date and Time:  03/26/2025 8:35 AM  Attending Physician: lAia Rodriguez, *   Discharging Provider: Alia Rodriguez MD  Primary Care Provider: Naya Britt NP    HPI:  No notes on file    Hospital Course:  No notes on file    Goals of Care Treatment Preferences:  Code Status: Full Code      Procedure(s) (LRB):  HYSTERECTOMY, TOTAL, LAPAROSCOPIC (N/A)  SALPINGECTOMY, LAPAROSCOPIC (Bilateral)         Significant Diagnostic Studies: Labs: CBC   Recent Labs   Lab 03/25/25  1343   WBC 6.19   HGB 11.2*   HCT 35.2*            Pending Diagnostic Studies:       Procedure Component Value Units Date/Time    Specimen to Pathology Gynecology and Obstetrics [[id number]] Collected: 03/26/25 0754    Order Status: Sent Lab Status: No result     Specimen: Tissue from Uterus, Cervix, Bilateral Fallopian Tubes           Final Active Diagnoses:    Diagnosis Date Noted POA    PRINCIPAL PROBLEM:  Abnormal uterine bleeding (AUB) [N93.9] 03/26/2025 Yes      Problems Resolved During this Admission:        Discharged Condition: good    Disposition: Home or Self CareHome    Follow Up:   Follow-up Information       Alia Rodriguez MD Follow up in 2 week(s).    Specialty: Obstetrics and Gynecology  Contact information:  [facility] [id number] [phone]                           Patient Instructions:      Diet Adult Regular     Lifting restrictions     Pelvic Rest     No dressing needed     Notify your health care provider if you experience any of the following:  temperature >100.4     Notify your health care provider if you experience any of the following:  persistent nausea and vomiting or diarrhea     Notify your health care provider if you experience any of the following:  severe uncontrolled pain      Notify your health care provider if you experience any of the following:  redness, tenderness, or signs of infection (pain, swelling, redness, odor or green/yellow discharge around incision site)     Notify your health care provider if you experience any of the following:  difficulty breathing or increased cough     Medications:  Reconciled Home Medications:      Medication List        START taking these medications      HYDROcodone-acetaminophen 5-325 mg per tablet  Commonly known as: NORCO  Take 1 tablet by mouth every 4 (four) hours as needed for Pain.            CONTINUE taking these medications      ascorbic acid (vitamin C) 1000 MG tablet  Commonly known as: VITAMIN C  Take 1,000 mg by mouth once daily.     cloNIDine 0.2 mg/24 hr td ptwk 0.2 mg/24 hr  Commonly known as: CATAPRES  Place 1 patch onto the skin every 7 days.     cyclobenzaprine 10 MG tablet  Commonly known as: FLEXERIL  Take 10 mg by mouth 3 (three) times daily.     pregabalin 50 MG capsule  Commonly known as: LYRICA  Take 50 mg by mouth 2 (two) times daily.     spironolactone 25 MG tablet  Commonly known as: ALDACTONE  TAKE 1 TABLET BY MOUTH EVERY DAY     traMADoL 50 mg tablet  Commonly known as: ULTRAM  Take 50 mg by mouth every 6 (six) hours.     TYLENOL ARTHRITIS PAIN ORAL  Take by mouth daily as needed.              Alia Rodriguez MD  Obstetrics & Gynecology  [place] [facility]

## 2025-03-26 NOTE — ANESTHESIA PROCEDURE NOTES
Intubation    Date/Time: 3/26/2025 7:33 AM    Performed by: Taz Alejandro  Authorized by: Desmond Charlton CRNA    Intubation:     Induction:  Intravenous    Intubated:  Postinduction    Mask Ventilation:  Easy mask    Attempts:  1    Attempted By:  CRNA    Method of Intubation:  Video laryngoscopy    Blade:  Pinto 3    Laryngeal View Grade: Grade I - full view of cords      Difficult Airway Encountered?: No      Airway Device:  Oral endotracheal tube    Airway Device Size:  7.0    Style/Cuff Inflation:  Cuffed    Inflation Amount (mL):  8    Tube secured:  21    Secured at:  The lips    Placement Verified By:  Capnometry    Complicating Factors:  Small mouth and obesity    Findings Post-Intubation:  BS equal bilateral

## 2025-03-26 NOTE — TRANSFER OF CARE
Anesthesia Transfer of Care Note    Patient: Carly Go    Procedure(s) Performed: Procedure(s) (LRB):  HYSTERECTOMY, TOTAL, LAPAROSCOPIC (N/A)  SALPINGECTOMY, LAPAROSCOPIC (Bilateral)    Patient location: PACU    Anesthesia Type: general    Transport from OR: Transported from OR on 6-10 L/min O2 by face mask with adequate spontaneous ventilation    Post pain: adequate analgesia    Post assessment: no apparent anesthetic complications and tolerated procedure well    Post vital signs: stable    Level of consciousness: sedated    Nausea/Vomiting: no nausea/vomiting    Complications: none    Transfer of care protocol was followed      Last vitals: Visit Vitals  BP (!) 161/88 (BP Location: Left arm, Patient Position: Lying)   Pulse 86   Temp 36.2 °C (97.2 °F) (Tympanic)   Resp 16   SpO2 100%   Breastfeeding No

## 2025-03-26 NOTE — ANESTHESIA PREPROCEDURE EVALUATION
03/26/2025  Carly Go is a 40 y.o., female.    Anesthesia Evaluation    I have reviewed the Patient Summary Reports.     I have reviewed the Nursing Notes. I have reviewed the NPO Status.   I have reviewed the Medications.     Review of Systems  Anesthesia Hx:  No problems with previous Anesthesia             Denies Family Hx of Anesthesia complications.    Denies Personal Hx of Anesthesia complications.                    Social:  Non-Smoker, Social Alcohol Use   Denies Tobacco Use. Denies Alcohol Use.   Hematology/Oncology:  Hematology Normal                                     EENT/Dental:           Jaw Problems:  Clicking (TMJ) and Pt advised of risk for TMJ Occurrance   Cardiovascular:  Exercise tolerance: good    Denies Hypertension.   Denies MI.  Denies CAD.              ECG has been reviewed. 11/2017 EKG Sinus tachycardia     Denies Coronary Artery Disease.                             Denies Hypertension.         Pulmonary:    Asthma asymptomatic  Denies Shortness of breath.  Denies Recent URI.    Asthma:  last episode was Hx of Childhood Asthma. Emergency visits this year is none.        Oral/Intravenous steroid use is no prior steroid Rx. Current breathing status is optimal, free of wheezing.             Renal/:   Denies Chronic Renal Disease.    Denies Renal Symptoms/Infections/Stones      Denies Kidney Function/Disease             Hepatic/GI:      Denies GERD.        Denies Esophageal / Stomach Disorders        Denies Liver Disease        Musculoskeletal:     L5 S1        Spine Disorders: lumbar and cervical       Spinal Stenosis, Lumbar Spinal Stenosis Cervical Spine Disorder, Cervical Disc Disease (s/p 3 level fusion for myelopathy), Radiculopathy, S/P Cervical Fusion, Cervical Spine Limited Mobility   Lumbar Spine Disorders, S/P Lumbar Fusion   Neurological:  Denies TIA.  Denies  CVA.    Denies Seizures.           Denies Seizure Disorder             Denies CVA - Cerebrovasular Accident     Denies TIA - Transient Ischemic Attack               Endocrine:   Denies Diabetes                Thyroid Disease       Recent bx of thyroid nodule      Psych:  Psychiatric History                  Physical Exam  General:  Well nourished and Obesity       Airway/Jaw/Neck:  Airway Findings: Mouth Opening: Normal     Tongue: Normal      General Airway Assessment: Adult      Mallampati: II   TM Distance: Normal, at least 6 cm   Jaw/Neck Findings: (Audible/Visial Clicking/locking of Jaw)     Neck ROM: Extension Decreased, Mild          Dental:  Dental Findings: In tact      Chest/Lungs:  Chest/Lungs Findings:  Clear to auscultation, Normal Respiratory Rate       Heart/Vascular:  Heart Findings: Rate: Normal  Rhythm: Regular Rhythm  Sounds: Normal                       Mental Status:  Mental Status Findings:  Cooperative, Alert and Oriented         Anesthesia Plan  Type of Anesthesia, risks & benefits discussed:  Anesthesia Type:  Gen ETT    Patient's Preference:   Plan Factors:          Intra-op Monitoring Plan: standard ASA monitors  Intra-op Monitoring Plan Comments:   Post Op Pain Control Plan: multimodal analgesia, IV/PO Opioids PRN and per primary service following discharge from PACU  Post Op Pain Control Plan Comments:     Induction:   IV  Beta Blocker:  Patient is not currently on a Beta-Blocker (No further documentation required).       Informed Consent: Informed consent signed with the Patient and all parties understand the risks and agree with anesthesia plan.  All questions answered.  Anesthesia consent signed with patient.  ASA Score: 2     Day of Surgery Review of History & Physical: I have interviewed and examined the patient. I have reviewed the patient's H&P dated: 3/26/25.    H&P Update referred to the surgeon/provider.          Ready For Surgery From Anesthesia Perspective.              Physical Exam  General: Well nourished and Obesity    Airway:  Mallampati: II   Mouth Opening: Normal  TM Distance: Normal, at least 6 cm  Tongue: Normal  Neck ROM: Extension Decreased, Mild    Dental:  In tact    Chest/Lungs:  Clear to auscultation, Normal Respiratory Rate    Heart:  Rate: Normal  Rhythm: Regular Rhythm  Sounds: Normal        Anesthesia Plan  Type of Anesthesia, risks & benefits discussed:    Anesthesia Type: Gen ETT  Intra-op Monitoring Plan: standard ASA monitors  Post Op Pain Control Plan: multimodal analgesia, IV/PO Opioids PRN and per primary service following discharge from PACU  Induction:  IV  Airway Plan: Direct  Informed Consent: Informed consent signed with the Patient and all parties understand the risks and agree with anesthesia plan.  All questions answered.   ASA Score: 2  Day of Surgery Review of History & Physical: H&P Update referred to the surgeon/provider.I have interviewed and examined the patient. I have reviewed the patient's H&P dated: 3/26/25. There are no significant changes.     Ready For Surgery From Anesthesia Perspective.     .

## 2025-03-26 NOTE — ANESTHESIA POSTPROCEDURE EVALUATION
Anesthesia Post Evaluation    Patient: Carly Go    Procedure(s) Performed: Procedure(s) (LRB):  HYSTERECTOMY, TOTAL, LAPAROSCOPIC (N/A)  SALPINGECTOMY, LAPAROSCOPIC (Bilateral)    Final Anesthesia Type: general      Patient location during evaluation: PACU  Patient participation: Yes- Able to Participate  Level of consciousness: awake and alert  Post-procedure vital signs: reviewed and stable  Pain management: adequate  Airway patency: patent  FAREED mitigation strategies: Preoperative initiation of continuous positive airway pressure (CPAP) or non-invasive positive pressure ventilation (NIPPV)  PONV status at discharge: No PONV  Anesthetic complications: no      Cardiovascular status: blood pressure returned to baseline  Respiratory status: spontaneous ventilation and room air  Hydration status: euvolemic  Follow-up not needed.              Vitals Value Taken Time   /88 03/26/25 08:30   Temp 36.2 °C (97.2 °F) 03/26/25 08:25   Pulse 86 03/26/25 08:30   Resp 16 03/26/25 08:30   SpO2 100 % 03/26/25 08:30         No case tracking events are documented in the log.      Pain/Abel Score: Pain Rating Prior to Med Admin: 4 (neck pain) (3/26/2025  6:59 AM)

## 2025-03-31 ENCOUNTER — TELEPHONE (OUTPATIENT)
Dept: OBSTETRICS AND GYNECOLOGY | Facility: CLINIC | Age: 40
End: 2025-03-31
Payer: COMMERCIAL

## 2025-03-31 NOTE — TELEPHONE ENCOUNTER
----- Message from Med Assistant Ordoñez sent at 3/31/2025 12:06 PM CDT -----  Contact: self  Carly Ko: [id number] Phone      Not on file.Work Phone      Not on file.Mobile          [phone] Care Team:Naya Britt NP as PCP - General (Family Medicine)Joel Schaefer MD as Obstetrician (Obstetrics)Alia Rodriguez MD as Consulting Physician (Obstetrics and Gynecology)OB? NoWhat phone number can you be reached at? [phone]: Would like to see if something can be called in for uti.  Pharmacy:  [facility]

## 2025-03-31 NOTE — TELEPHONE ENCOUNTER
Patient reports burning, urgency, and cloudy urine. She is requesting something be sent in to the pharmacy for her. Please advise.

## 2025-04-01 ENCOUNTER — RESULTS FOLLOW-UP (OUTPATIENT)
Dept: OBSTETRICS AND GYNECOLOGY | Facility: CLINIC | Age: 40
End: 2025-04-01

## 2025-04-01 VITALS
DIASTOLIC BLOOD PRESSURE: 79 MMHG | TEMPERATURE: 97 F | HEIGHT: 67 IN | WEIGHT: 217.63 LBS | HEART RATE: 84 BPM | OXYGEN SATURATION: 98 % | SYSTOLIC BLOOD PRESSURE: 144 MMHG | RESPIRATION RATE: 18 BRPM | BODY MASS INDEX: 34.16 KG/M2

## 2025-04-01 LAB
ESTROGEN SERPL-MCNC: NORMAL PG/ML
INSULIN SERPL-ACNC: NORMAL U[IU]/ML
LAB AP CLINICAL INFORMATION: NORMAL
LAB AP GROSS DESCRIPTION: NORMAL
LAB AP PERFORMING LOCATION(S): NORMAL
LAB AP REPORT FOOTNOTES: NORMAL
T3RU NFR SERPL: NORMAL %

## 2025-04-01 RX ORDER — SULFAMETHOXAZOLE AND TRIMETHOPRIM 800; 160 MG/1; MG/1
1 TABLET ORAL 2 TIMES DAILY
Qty: 6 TABLET | Refills: 0 | Status: SHIPPED | OUTPATIENT
Start: 2025-04-01 | End: 2025-04-04

## 2025-04-01 NOTE — TELEPHONE ENCOUNTER
Message  Received: Today   Pt Advice  Smita Go, MA  P Jennifer Rojo Staff  Caller: SELF (Today, 12:11 PM)  Carly Go  MRN: [id number]  Home Phone      Not on file.  Work Phone      Not on file.  Mobile          [phone]    Patient Care Team:  Naya Britt NP as PCP - General (Family Medicine)  Joel Schaefer MD as Obstetrician (Obstetrics)  Alia Rodriguez MD as Consulting Physician (Obstetrics and Gynecology)  OB? No  What phone number can you be reached at? [phone]  Message: Would like to speak to nurse regarding still having abd pain since hysterectomy.  Stated pain is not getting better only getting worse.

## 2025-04-01 NOTE — TELEPHONE ENCOUNTER
Patient 6 days post-op TLH. Patient requesting a refill on pain medication. Patient states she has 3 Hydrocodone left and can wait until tomorrow for refill.

## 2025-04-04 RX ORDER — SULFAMETHOXAZOLE AND TRIMETHOPRIM 800; 160 MG/1; MG/1
1 TABLET ORAL 2 TIMES DAILY
Qty: 6 TABLET | Refills: 0 | OUTPATIENT
Start: 2025-04-04 | End: 2025-04-07

## 2025-04-04 NOTE — TELEPHONE ENCOUNTER
Patient can not take NSAID's or ASA due to allergy of Anaphylaxis in the past. Patient had an old prescription of Tramadol from a previous back surgery that she didn't use so she is alternating that with Tylenol and is doing okay. Patient states that she completed the 3 days of Bactrim DS and the burning with urination has improved but she is still feeling a lot of bladder pressure and bladder spasms with urinating. Instructed patient to try AZO to see if it helps the bladder pain. Patient inquiring if she would need a few more days of antibiotics also since it is the weekend.     Patient also reports taking medication to help have bowel movements every other day because she does not feel any urge at all to have a bowel movement. Patient states that even if she tries to push or lightly strain that it does not feel like she is doing anything at all. Please advise.

## 2025-04-09 ENCOUNTER — OFFICE VISIT (OUTPATIENT)
Dept: OBSTETRICS AND GYNECOLOGY | Facility: CLINIC | Age: 40
End: 2025-04-09
Payer: COMMERCIAL

## 2025-04-09 VITALS
HEART RATE: 85 BPM | HEIGHT: 67 IN | BODY MASS INDEX: 32.91 KG/M2 | WEIGHT: 209.69 LBS | SYSTOLIC BLOOD PRESSURE: 122 MMHG | DIASTOLIC BLOOD PRESSURE: 80 MMHG

## 2025-04-09 DIAGNOSIS — N32.89 BLADDER SPASMS: ICD-10-CM

## 2025-04-09 DIAGNOSIS — Z90.710 S/P LAPAROSCOPIC HYSTERECTOMY: Primary | ICD-10-CM

## 2025-04-09 DIAGNOSIS — Z51.89 VISIT FOR WOUND CHECK: ICD-10-CM

## 2025-04-09 DIAGNOSIS — K59.03 DRUG-INDUCED CONSTIPATION: ICD-10-CM

## 2025-04-09 PROCEDURE — 99999 PR PBB SHADOW E&M-EST. PATIENT-LVL III: CPT | Mod: PBBFAC,,, | Performed by: OBSTETRICS & GYNECOLOGY

## 2025-04-09 PROCEDURE — 3079F DIAST BP 80-89 MM HG: CPT | Mod: CPTII,S$GLB,, | Performed by: OBSTETRICS & GYNECOLOGY

## 2025-04-09 PROCEDURE — 1159F MED LIST DOCD IN RCRD: CPT | Mod: CPTII,S$GLB,, | Performed by: OBSTETRICS & GYNECOLOGY

## 2025-04-09 PROCEDURE — 3074F SYST BP LT 130 MM HG: CPT | Mod: CPTII,S$GLB,, | Performed by: OBSTETRICS & GYNECOLOGY

## 2025-04-09 PROCEDURE — 99024 POSTOP FOLLOW-UP VISIT: CPT | Mod: S$GLB,,, | Performed by: OBSTETRICS & GYNECOLOGY

## 2025-04-09 RX ORDER — PHENAZOPYRIDINE HYDROCHLORIDE 200 MG/1
200 TABLET, FILM COATED ORAL 3 TIMES DAILY PRN
Qty: 20 TABLET | Refills: 0 | Status: SHIPPED | OUTPATIENT
Start: 2025-04-09 | End: 2026-04-09

## 2025-04-09 RX ORDER — ALPRAZOLAM 0.25 MG/1
0.25 TABLET ORAL 2 TIMES DAILY PRN
COMMUNITY
Start: 2025-03-24

## 2025-04-09 NOTE — PROGRESS NOTES
"Chief Complaint   Patient presents with    Post-op Evaluation     Constant "crampy" feeling       Carly Go is a 40 y.o. female  post-op from a TLH/BS on 3/26.  Patient is Doing well postoperatively..      Patient reports having constipation and bladder spasms.     The pathology revealed:  1. Uterus, cervix, and bilateral fallopian tubes; total hysterectomy and salpingectomy:   Uterus with proliferative endometrium  Cervix with no diagnostic histopathologic abnormalities  Fallopian tubes with no diagnostic histopathologic abnormalities  Negative for hyperplasia, atypia, dysplasia, and malignancy     Past Medical History:   Diagnosis Date    Abnormal Pap smear of cervix age 19    no treatment    Asthma     Breast cancer     Encounter for blood transfusion     Lumbar stenosis 2017    Mental disorder     anxiety, depression    PONV (postoperative nausea and vomiting)     Thyroid disease 2017    thyroid mass, recent biopsy     Past Surgical History:   Procedure Laterality Date    BACK SURGERY      lumbar laminectomy    DILATION AND CURETTAGE OF UTERUS  2004    EXCISIONAL BIOPSY Right 2019    Procedure: EXCISIONAL BIOPSY RIGHT BREAST;  Surgeon: Joi Blanchard MD;  Location: [address];  Service: General;  Laterality: Right;    LAPAROSCOPIC SALPINGECTOMY Bilateral 3/26/2025    Procedure: SALPINGECTOMY, LAPAROSCOPIC;  Surgeon: Alia Rodriguez MD;  Location: [facility];  Service: OB/GYN;  Laterality: Bilateral;    LAPAROSCOPIC TOTAL HYSTERECTOMY N/A 3/26/2025    Procedure: HYSTERECTOMY, TOTAL, LAPAROSCOPIC;  Surgeon: Alia Rodriguez MD;  Location: [facility];  Service: OB/GYN;  Laterality: N/A;    NECK SURGERY  2022    OOPHORECTOMY  2009    Left    TUBAL LIGATION  2016     Family History   Problem Relation Name Age of Onset    Breast cancer Maternal Grandmother  53    Colon cancer Maternal Grandmother      Ovarian cancer Maternal Grandmother      Colon cancer Father  56    Breast " "cancer Mother  35     Social History[1]  OB History    Para Term  AB Living   3 2 2  1 2   SAB IAB Ectopic Multiple Live Births   0 1  0 2      # Outcome Date GA Lbr Domingo/2nd Weight Sex Type Anes PTL Lv   3 Term 15 39w2d  3.979 kg (8 lb 12.4 oz) F Vag-Spont  N KEYON   2 Term 09/15/11 37w0d  3.062 kg (6 lb 12 oz) F Vag-Spont EPI N KEYON   1 IAB  20w0d             Birth Comments: D&C, BAby did not have skull formation       /80   Pulse 85   Ht 5' 7" (1.702 m)   Wt 95.1 kg (209 lb 10.5 oz)   LMP 2025 (Exact Date)   BMI 32.84 kg/m²     ROS:  GENERAL: No fever, chills, fatigability or weight loss.  VULVAR: No pain, no lesions and no itching.  VAGINAL: No relaxation, no itching, no discharge, no abnormal bleeding and no lesions.  ABDOMEN: No abdominal pain. Denies nausea. Denies vomiting. No diarrhea. No constipation  BREAST: Denies pain. No lumps. No discharge.  URINARY: No incontinence, no nocturia, no frequency and no dysuria.  CARDIOVASCULAR: No chest pain. No shortness of breath. No leg cramps.  NEUROLOGICAL: No headaches. No vision changes.    PE:   Abdomen: soft, non-tender; bowel sounds normal; no masses,  no organomegaly  Incisions: c/d/i      ASSESSMENT:    1. S/P laparoscopic hysterectomy        2. Visit for wound check        3. Drug-induced constipation        4. Bladder spasms             PLAN:  1. Pathology reviewed  2. Post op surgical instructions  3. RTC in 4 weeks  4. Bowel regimen  5. Rx of pyridium         [1]   Social History  Tobacco Use    Smoking status: Every Day     Types: Cigarettes, Vaping with nicotine     Passive exposure: Never    Smokeless tobacco: Current   Substance Use Topics    Alcohol use: Yes     Comment: occasionally    Drug use: No     "

## 2025-05-08 ENCOUNTER — OFFICE VISIT (OUTPATIENT)
Dept: OBSTETRICS AND GYNECOLOGY | Facility: CLINIC | Age: 40
End: 2025-05-08
Payer: COMMERCIAL

## 2025-05-08 VITALS
BODY MASS INDEX: 33.05 KG/M2 | HEART RATE: 104 BPM | DIASTOLIC BLOOD PRESSURE: 76 MMHG | WEIGHT: 210.56 LBS | HEIGHT: 67 IN | SYSTOLIC BLOOD PRESSURE: 130 MMHG

## 2025-05-08 DIAGNOSIS — Z51.89 VISIT FOR WOUND CHECK: ICD-10-CM

## 2025-05-08 DIAGNOSIS — Z90.710 S/P LAPAROSCOPIC HYSTERECTOMY: Primary | ICD-10-CM

## 2025-05-08 PROCEDURE — 99999 PR PBB SHADOW E&M-EST. PATIENT-LVL III: CPT | Mod: PBBFAC,,, | Performed by: OBSTETRICS & GYNECOLOGY

## 2025-05-08 NOTE — PROGRESS NOTES
Chief Complaint   Patient presents with    Post-op Evaluation       Carly Go is a 40 y.o. female  post-op from a TLH/BS on 3/26.  Patient is Doing well postoperatively..      Patient reports bladder spasms improved; she does have some pain in her vagina when she sneezes. Reports some brown discharge.     The pathology revealed:  1. Uterus, cervix, and bilateral fallopian tubes; total hysterectomy and salpingectomy:   Uterus with proliferative endometrium  Cervix with no diagnostic histopathologic abnormalities  Fallopian tubes with no diagnostic histopathologic abnormalities  Negative for hyperplasia, atypia, dysplasia, and malignancy     Past Medical History:   Diagnosis Date    Abnormal Pap smear of cervix age 19    no treatment    Asthma     Breast cancer     Encounter for blood transfusion     Lumbar stenosis 2017    Mental disorder     anxiety, depression    PONV (postoperative nausea and vomiting)     Thyroid disease 2017    thyroid mass, recent biopsy     Past Surgical History:   Procedure Laterality Date    BACK SURGERY      lumbar laminectomy    DILATION AND CURETTAGE OF UTERUS      EXCISIONAL BIOPSY Right 2019    Procedure: EXCISIONAL BIOPSY RIGHT BREAST;  Surgeon: Joi Blanchard MD;  Location: [address];  Service: General;  Laterality: Right;    LAPAROSCOPIC SALPINGECTOMY Bilateral 3/26/2025    Procedure: SALPINGECTOMY, LAPAROSCOPIC;  Surgeon: Alia Rodriguez MD;  Location: [facility];  Service: OB/GYN;  Laterality: Bilateral;    LAPAROSCOPIC TOTAL HYSTERECTOMY N/A 3/26/2025    Procedure: HYSTERECTOMY, TOTAL, LAPAROSCOPIC;  Surgeon: Alia Rodriguez MD;  Location: [facility];  Service: OB/GYN;  Laterality: N/A;    NECK SURGERY  2022    OOPHORECTOMY  2009    Left    TUBAL LIGATION  2016     Family History   Problem Relation Name Age of Onset    Breast cancer Maternal Grandmother  53    Colon cancer Maternal Grandmother      Ovarian cancer Maternal  "Grandmother      Colon cancer Father  56    Breast cancer Mother  35     Social History[1]  OB History    Para Term  AB Living   3 2 2  1 2   SAB IAB Ectopic Multiple Live Births   0 1  0 2      # Outcome Date GA Lbr Domingo/2nd Weight Sex Type Anes PTL Lv   3 Term 15 39w2d  3.979 kg (8 lb 12.4 oz) F Vag-Spont  N KEYON   2 Term 09/15/11 37w0d  3.062 kg (6 lb 12 oz) F Vag-Spont EPI N KEYON   1 IAB  20w0d             Birth Comments: D&C, BAby did not have skull formation       /76   Pulse 104   Ht 5' 7" (1.702 m)   Wt 95.5 kg (210 lb 8.6 oz)   LMP 2025 (Exact Date)   BMI 32.98 kg/m²     ROS:  GENERAL: No fever, chills, fatigability or weight loss.  VULVAR: No pain, no lesions and no itching.  VAGINAL: No relaxation, no itching, no discharge, no abnormal bleeding and no lesions.  ABDOMEN: No abdominal pain. Denies nausea. Denies vomiting. No diarrhea. No constipation  BREAST: Denies pain. No lumps. No discharge.  URINARY: No incontinence, no nocturia, no frequency and no dysuria.  CARDIOVASCULAR: No chest pain. No shortness of breath. No leg cramps.  NEUROLOGICAL: No headaches. No vision changes.    PE:   Abdomen: soft, non-tender; bowel sounds normal; no masses,  no organomegaly  Incisions: c/d/I  Cuff: brown discharge, stitches present, mild tenderness       ASSESSMENT:    1. S/P laparoscopic hysterectomy        2. Visit for wound check             PLAN:  1. RTC in 4 weeks  2. Continue pelvic rest           [1]   Social History  Tobacco Use    Smoking status: Every Day     Types: Cigarettes, Vaping with nicotine     Passive exposure: Never    Smokeless tobacco: Current   Substance Use Topics    Alcohol use: Yes     Comment: occasionally    Drug use: No     "

## 2025-06-11 ENCOUNTER — OFFICE VISIT (OUTPATIENT)
Dept: OBSTETRICS AND GYNECOLOGY | Facility: CLINIC | Age: 40
End: 2025-06-11
Payer: COMMERCIAL

## 2025-06-11 VITALS
DIASTOLIC BLOOD PRESSURE: 84 MMHG | HEIGHT: 67 IN | HEART RATE: 108 BPM | WEIGHT: 210.75 LBS | SYSTOLIC BLOOD PRESSURE: 142 MMHG | BODY MASS INDEX: 33.08 KG/M2

## 2025-06-11 DIAGNOSIS — Z90.710 S/P LAPAROSCOPIC HYSTERECTOMY: Primary | ICD-10-CM

## 2025-06-11 DIAGNOSIS — N76.0 VULVOVAGINITIS: ICD-10-CM

## 2025-06-11 DIAGNOSIS — Z51.89 VISIT FOR WOUND CHECK: ICD-10-CM

## 2025-06-11 PROCEDURE — 3077F SYST BP >= 140 MM HG: CPT | Mod: CPTII,S$GLB,, | Performed by: OBSTETRICS & GYNECOLOGY

## 2025-06-11 PROCEDURE — 99213 OFFICE O/P EST LOW 20 MIN: CPT | Mod: 24,S$GLB,, | Performed by: OBSTETRICS & GYNECOLOGY

## 2025-06-11 PROCEDURE — 1159F MED LIST DOCD IN RCRD: CPT | Mod: CPTII,S$GLB,, | Performed by: OBSTETRICS & GYNECOLOGY

## 2025-06-11 PROCEDURE — 4010F ACE/ARB THERAPY RXD/TAKEN: CPT | Mod: CPTII,S$GLB,, | Performed by: OBSTETRICS & GYNECOLOGY

## 2025-06-11 PROCEDURE — 81515 NFCT DS BV&VAGINITIS DNA ALG: CPT | Performed by: OBSTETRICS & GYNECOLOGY

## 2025-06-11 PROCEDURE — 99999 PR PBB SHADOW E&M-EST. PATIENT-LVL III: CPT | Mod: PBBFAC,,, | Performed by: OBSTETRICS & GYNECOLOGY

## 2025-06-11 PROCEDURE — 3079F DIAST BP 80-89 MM HG: CPT | Mod: CPTII,S$GLB,, | Performed by: OBSTETRICS & GYNECOLOGY

## 2025-06-11 PROCEDURE — 3008F BODY MASS INDEX DOCD: CPT | Mod: CPTII,S$GLB,, | Performed by: OBSTETRICS & GYNECOLOGY

## 2025-06-11 RX ORDER — CEFDINIR 300 MG/1
300 CAPSULE ORAL EVERY 12 HOURS
COMMUNITY
Start: 2025-06-02

## 2025-06-11 RX ORDER — VALSARTAN 160 MG/1
160 TABLET ORAL DAILY
COMMUNITY
Start: 2025-06-02

## 2025-06-11 RX ORDER — FLUCONAZOLE 150 MG/1
150 TABLET ORAL ONCE
Qty: 1 TABLET | Refills: 0 | Status: SHIPPED | OUTPATIENT
Start: 2025-06-11 | End: 2025-06-11

## 2025-06-11 NOTE — PROGRESS NOTES
Chief Complaint   Patient presents with    Post-op Evaluation       Carly Go is a 40 y.o. female  post-op from a TLH/BS on 3/26.  Patient is Doing well postoperatively.      She reported brown discharge at last visit and was given 4 additional weeks of pelvic rest; here for cuff check.     The pathology revealed:  1. Uterus, cervix, and bilateral fallopian tubes; total hysterectomy and salpingectomy:   Uterus with proliferative endometrium  Cervix with no diagnostic histopathologic abnormalities  Fallopian tubes with no diagnostic histopathologic abnormalities  Negative for hyperplasia, atypia, dysplasia, and malignancy     Past Medical History:   Diagnosis Date    Abnormal Pap smear of cervix age 19    no treatment    Asthma     Breast cancer     Encounter for blood transfusion     Lumbar stenosis 2017    Mental disorder     anxiety, depression    PONV (postoperative nausea and vomiting)     Thyroid disease 2017    thyroid mass, recent biopsy     Past Surgical History:   Procedure Laterality Date    BACK SURGERY      lumbar laminectomy    DILATION AND CURETTAGE OF UTERUS      EXCISIONAL BIOPSY Right 2019    Procedure: EXCISIONAL BIOPSY RIGHT BREAST;  Surgeon: Joi Blanchard MD;  Location: [address];  Service: General;  Laterality: Right;    LAPAROSCOPIC SALPINGECTOMY Bilateral 3/26/2025    Procedure: SALPINGECTOMY, LAPAROSCOPIC;  Surgeon: Alia Rodriguez MD;  Location: [facility];  Service: OB/GYN;  Laterality: Bilateral;    LAPAROSCOPIC TOTAL HYSTERECTOMY N/A 3/26/2025    Procedure: HYSTERECTOMY, TOTAL, LAPAROSCOPIC;  Surgeon: Alia Rodriguez MD;  Location: [facility];  Service: OB/GYN;  Laterality: N/A;    NECK SURGERY  2022    OOPHORECTOMY  2009    Left    TUBAL LIGATION  2016     Family History   Problem Relation Name Age of Onset    Breast cancer Maternal Grandmother  53    Colon cancer Maternal Grandmother      Ovarian cancer Maternal Grandmother      Colon  "cancer Father  56    Breast cancer Mother  35     Social History[1]  OB History    Para Term  AB Living   3 2 2  1 2   SAB IAB Ectopic Multiple Live Births   0 1  0 2      # Outcome Date GA Lbr Domingo/2nd Weight Sex Type Anes PTL Lv   3 Term 15 39w2d  3.979 kg (8 lb 12.4 oz) F Vag-Spont  N KEYON   2 Term 09/15/11 37w0d  3.062 kg (6 lb 12 oz) F Vag-Spont EPI N KEYON   1 IAB  20w0d             Birth Comments: D&C, BAby did not have skull formation       BP (!) 142/84   Pulse 108   Ht 5' 7" (1.702 m)   Wt 95.6 kg (210 lb 12.2 oz)   LMP 2025 (Exact Date)   BMI 33.01 kg/m²     ROS:  GENERAL: No fever, chills, fatigability or weight loss.  VULVAR: No pain, no lesions and no itching.  VAGINAL: No relaxation, no itching, no discharge, no abnormal bleeding and no lesions.  ABDOMEN: No abdominal pain. Denies nausea. Denies vomiting. No diarrhea. No constipation  BREAST: Denies pain. No lumps. No discharge.  URINARY: No incontinence, no nocturia, no frequency and no dysuria.  CARDIOVASCULAR: No chest pain. No shortness of breath. No leg cramps.  NEUROLOGICAL: No headaches. No vision changes.    PE:   Abdomen: soft, non-tender; bowel sounds normal; no masses,  no organomegaly  Incisions: c/d/I  Cuff: white cottage cheese discharge; erythema to vagina. Cuff intact and well healed      ASSESSMENT:    1. S/P laparoscopic hysterectomy        2. Visit for wound check        3. Vulvovaginitis  Vaginosis Screen by DNA Probe           PLAN:  1. Release  2. Affirm  3. Rx of Diflucan             [1]   Social History  Tobacco Use    Smoking status: Every Day     Types: Cigarettes, Vaping with nicotine     Passive exposure: Never    Smokeless tobacco: Current   Substance Use Topics    Alcohol use: Yes     Comment: occasionally    Drug use: No     "

## 2025-06-12 ENCOUNTER — RESULTS FOLLOW-UP (OUTPATIENT)
Dept: OBSTETRICS AND GYNECOLOGY | Facility: CLINIC | Age: 40
End: 2025-06-12

## 2025-06-12 LAB
BACTERIAL VAGINOSIS DNA (OHS): NOT DETECTED
CANDIDA GLABRATA/KRUSEI DNA (OHS): NOT DETECTED
CANDIDA SPECIES DNA (OHS): DETECTED
TRICHOMONAS VAGINALIS DNA (OHS): NOT DETECTED

## (undated) DEVICE — NDL 18GA X1 1/2 REG BEVEL

## (undated) DEVICE — ELECTRODE LAP FLAT L-HOOK 36CM

## (undated) DEVICE — TRAY CATH 1-LYR URIMTR 16FR

## (undated) DEVICE — Device

## (undated) DEVICE — SEE MEDLINE ITEM 146417

## (undated) DEVICE — ADHESIVE DERMABOND ADVANCED

## (undated) DEVICE — VOYANT MARYLAND FUSION DEVICE

## (undated) DEVICE — SUT CTD VICRYL 3-0 PS-1

## (undated) DEVICE — TRAY CATH URET CURITY 59-86F

## (undated) DEVICE — ENDOSTITCH INSTRUMENT

## (undated) DEVICE — KITTNER ENDOSCOPIC SINGLE TIP

## (undated) DEVICE — TRAY MINOR GEN SURG

## (undated) DEVICE — SUT 2/0 30IN SILK BLK BRAI

## (undated) DEVICE — APPLICATOR CHLORAPREP ORN 26ML

## (undated) DEVICE — PACK UNIVERSAL SPLIT II

## (undated) DEVICE — GAUZE FLUFF XXLG 36X36 2 PLY

## (undated) DEVICE — ELECTRODE REM PLYHSV RETURN 9

## (undated) DEVICE — RELOAD V-LOC 180 0 8IN/20CM

## (undated) DEVICE — SOL 0.9% NACL IRRI.IN STERIL

## (undated) DEVICE — TUBING LAPARSCOPIC INSUFFLATIN

## (undated) DEVICE — SYS LABEL CORRECT MED

## (undated) DEVICE — SUT VICRYL 3-0 27 SH

## (undated) DEVICE — PENCIL ROCKER SWITCH 10FT CORD

## (undated) DEVICE — NDL INSUF ULTRA VERESS 120MM

## (undated) DEVICE — BRA POST SURGICAL WHT 44-46IN

## (undated) DEVICE — ELECTRODE BLADE INSULATED 1 IN

## (undated) DEVICE — SUT MCRYL PLUS 4-0 PS2 27IN

## (undated) DEVICE — GAUZE SPONGE 4X4 12PLY

## (undated) DEVICE — TRAY DRY SKIN SCRUB PREP

## (undated) DEVICE — IRRIGATOR ENDOSCOPY DISP.

## (undated) DEVICE — TROCAR KII FIOS ZTHREAD 11X100

## (undated) DEVICE — SPONGE LAP 18X18 PREWASHED

## (undated) DEVICE — SOL CLEARIFY VISUALIZATION LAP

## (undated) DEVICE — CONTAINER SPECIMEN STRL 4OZ

## (undated) DEVICE — TROCAR LAPSCP KII SZ 11 10CM

## (undated) DEVICE — DRAPE STERI INSTRUMENT 1018